# Patient Record
Sex: FEMALE | Race: WHITE | Employment: OTHER | URBAN - METROPOLITAN AREA
[De-identification: names, ages, dates, MRNs, and addresses within clinical notes are randomized per-mention and may not be internally consistent; named-entity substitution may affect disease eponyms.]

---

## 2018-04-24 ENCOUNTER — OFFICE VISIT (OUTPATIENT)
Dept: URGENT CARE | Age: 58
End: 2018-04-24

## 2018-04-24 VITALS
HEIGHT: 63 IN | HEART RATE: 68 BPM | WEIGHT: 238 LBS | OXYGEN SATURATION: 99 % | RESPIRATION RATE: 18 BRPM | DIASTOLIC BLOOD PRESSURE: 83 MMHG | SYSTOLIC BLOOD PRESSURE: 139 MMHG | TEMPERATURE: 97.8 F | BODY MASS INDEX: 42.17 KG/M2
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
## 2018-04-24 RX ORDER — PREDNISONE 10 MG/1
TABLET ORAL
Qty: 21 TAB | Refills: 0 | Status: SHIPPED | OUTPATIENT
Start: 2018-04-24 | End: 2018-08-10 | Stop reason: ALTCHOICE

## 2018-04-24 NOTE — PROGRESS NOTES
Patient is a 59 y.o. female presenting with rash. Rash    The history is provided by the patient. This is a new problem. The current episode started yesterday (reports multiple insect bited on left arm ). Associated with: unknown insect. There has been no fever. The rash is present on the left arm. The patient is experiencing no pain. Associated symptoms include itching. Pertinent negatives include no blisters. She has tried nothing for the symptoms. Past Medical History:   Diagnosis Date    Elevated BP         Past Surgical History:   Procedure Laterality Date    HX  SECTION      X4         Family History   Problem Relation Age of Onset    Hypertension Mother     Cancer Brother      lymphoma    Cancer Maternal Uncle      unknown    Hypertension Maternal Grandmother     Hypertension Maternal Grandfather     Cancer Maternal Grandfather      stomach cancer    Cancer Other      breast cancer (cousin)        Social History     Social History    Marital status:      Spouse name: N/A    Number of children: N/A    Years of education: N/A     Occupational History    Not on file. Social History Main Topics    Smoking status: Never Smoker    Smokeless tobacco: Never Used    Alcohol use No    Drug use: No    Sexual activity: Yes     Other Topics Concern    Not on file     Social History Narrative                ALLERGIES: Review of patient's allergies indicates no known allergies. Review of Systems   Constitutional: Negative for chills and fever. Respiratory: Negative for shortness of breath and wheezing. Cardiovascular: Negative for chest pain and palpitations. Musculoskeletal: Negative for myalgias. Skin: Positive for color change, itching and rash. Neurological: Negative for weakness and numbness. Hematological: Negative for adenopathy.        Vitals:    18 1416   BP: 139/83   Pulse: 68   Resp: 18   Temp: 97.8 °F (36.6 °C)   SpO2: 99%   Weight: 238 lb (108 kg)   Height: 5' 3\" (1.6 m)       Physical Exam   Constitutional: She appears well-developed and well-nourished. No distress. Neurological: She is alert. Skin: She is not diaphoretic. Left arm: multiple large erythematous raised lesions with central puncture wounds; non-ttp; no fluctuance   Psychiatric: She has a normal mood and affect. Her behavior is normal. Judgment and thought content normal.   Nursing note and vitals reviewed. MDM    Procedures        ICD-10-CM ICD-9-CM    1. Insect bite, initial encounter W57. XXXA 919.4      E906.4      Medications Ordered Today   Medications    predniSONE (STERAPRED DS) 10 mg dose pack     Sig: Take as directed for 6 days, with food     Dispense:  21 Tab     Refill:  0     The patients condition was discussed with the patient and they understand. The patient is to follow up with PCP. If signs and symptoms become worse the pt is to go to the ER. The patient is to take medications as prescribed.

## 2018-08-10 ENCOUNTER — OFFICE VISIT (OUTPATIENT)
Dept: OBGYN CLINIC | Age: 58
End: 2018-08-10

## 2018-08-10 VITALS
HEIGHT: 63 IN | RESPIRATION RATE: 16 BRPM | WEIGHT: 226.6 LBS | SYSTOLIC BLOOD PRESSURE: 140 MMHG | DIASTOLIC BLOOD PRESSURE: 80 MMHG | BODY MASS INDEX: 40.15 KG/M2

## 2018-08-10 DIAGNOSIS — N95.0 POST-MENOPAUSAL BLEEDING: ICD-10-CM

## 2018-08-10 DIAGNOSIS — N93.9 VAGINAL BLEEDING: Primary | ICD-10-CM

## 2018-08-10 PROBLEM — E66.01 OBESITY, MORBID (HCC): Status: ACTIVE | Noted: 2018-08-10

## 2018-08-10 LAB
BILIRUB UR QL STRIP: NEGATIVE
GLUCOSE UR-MCNC: NEGATIVE MG/DL
KETONES P FAST UR STRIP-MCNC: NORMAL MG/DL
PH UR STRIP: 5 [PH] (ref 4.6–8)
PROT UR QL STRIP: NORMAL
SP GR UR STRIP: 1.01 (ref 1–1.03)
UA UROBILINOGEN AMB POC: NORMAL (ref 0.2–1)
URINALYSIS CLARITY POC: CLEAR
URINALYSIS COLOR POC: YELLOW
URINE BLOOD POC: NORMAL
URINE LEUKOCYTES POC: NORMAL
URINE NITRITES POC: NEGATIVE

## 2018-08-10 NOTE — PROGRESS NOTES
Chidi Yi is a 59 y.o. female    Chief Complaint   Patient presents with    Vaginal Bleeding    Back Pain     Visit Vitals    /80    Resp 16    Ht 5' 3\" (1.6 m)    Wt 226 lb 9.6 oz (102.8 kg)    BMI 40.14 kg/m2          Chidi Yi is a 59 y.o. female who complains of vaginal bleeding     Her current method of family planning is none. The patient is not sexually active. She developed this problem approximately 1 week ago. Pink staining. Lower abdominal and back discomfort. Minimal dysuria  No other hx of vaginal bleeding      Her relevant past medical history:   Past Medical History:   Diagnosis Date    Elevated BP         Past Surgical History:   Procedure Laterality Date    HX  SECTION      X4     Social History     Occupational History    Not on file.      Social History Main Topics    Smoking status: Never Smoker    Smokeless tobacco: Never Used    Alcohol use No    Drug use: No    Sexual activity: Yes     Family History   Problem Relation Age of Onset    Hypertension Mother     Cancer Brother      lymphoma    Cancer Maternal Uncle      unknown    Hypertension Maternal Grandmother     Hypertension Maternal Grandfather     Cancer Maternal Grandfather      stomach cancer    Cancer Other      breast cancer (cousin)       No Known Allergies  Prior to Admission medications    Not on File        Review of Systems - History obtained from the patient  Constitutional: negative for weight loss, fever, night sweats  HEENT: negative for hearing loss, earache, congestion, snoring, sorethroat  CV: negative for chest pain, palpitations, edema  Resp: negative for cough, shortness of breath, wheezing  Breast: negative for breast lumps, nipple discharge, galactorrhea  GI: negative for change in bowel habits, abdominal pain, black or bloody stools  : negative for frequency, dysuria, hematuria  MSK: negative for back pain, joint pain, muscle pain  Skin: negative for itching, rash, hives  Neuro: negative for dizziness, headache, confusion, weakness  Psych: negative for anxiety, depression, change in mood  Heme/lymph: negative for bleeding, bruising, pallor      Objective:  Visit Vitals    /80    Resp 16    Ht 5' 3\" (1.6 m)    Wt 226 lb 9.6 oz (102.8 kg)    BMI 40.14 kg/m2          PHYSICAL EXAMINATION    Constitutional  · Appearance: well-nourished, well developed, alert, in no acute distress    HENT  · Head and Face: appears normal      Gastrointestinal  · Abdominal Examination: abdomen non-tender to palpation, normal bowel sounds, no masses present  · Liver and spleen: no hepatomegaly present, spleen not palpable  · Hernias: no hernias identified    Genitourinary  · External Genitalia: normal appearance for age, no discharge present, no tenderness present, no inflammatory lesions present, no masses present,  atrophy present  · Vagina: normal vaginal vault without central or paravaginal defects, no discharge present, no inflammatory lesions present, no masses present  · Bladder: non-tender to palpation  · Urethra: appears normal  · Cervix: normal   · Uterus: normal size, shape and consistency  · Adnexa: no adnexal tenderness present, no adnexal masses present  · Perineum: perineum within normal limits, no evidence of trauma, no rashes or skin lesions present  · Anus: anus within normal limits, no hemorrhoids present  · Inguinal Lymph Nodes: no lymphadenopathy present  Procedure note: Endometrial biopsy    Yamilet Son is a No obstetric history on file. ,  59 y.o. female [facility] No LMP recorded. Patient is postmenopausal.  The patient has a history of The encounter diagnosis was Vaginal bleeding. and presents for an endometrial biopsy. Indications:   After the indications, risks, benefits, and alternatives to performing an endometrial biopsy were explained to the patient, her questions were answered and informed consent was obtained. Procedure:   The patient was placed on the table in the dorsal lithotomy position. A bimanual exam showed the uterus to be anterior. The uterus was not enlarged. A speculum was placed in the vagina. The cervix was visualized and prepped with betadine. An Allis tenaculum was   placed on the anterior lip of the cervix for traction. It was   necessary to dilate the cervix. A pipelle was passed through the endocervical canal without difficulty. The uterus was sounded to 8 cm's. A moderate amount of tissue was returned. This tissue was placed in formalin and sent to pathology. It was felt that an adequate sample was obtained. The patient tolerated the procedure well and she reported mild cramping. The tenaculum and speculum were removed. Post Procedural Status: The patient was observed for 2 minutes after the procedure. She had mild cramping at the time of discharge. There were no complications. The patient was discharged in stable condition.        Skin  · General Inspection: no rash, no lesions identified    Neurologic/Psychiatric  · Mental Status:  · Orientation: grossly oriented to person, place and time  · Mood and Affect: mood normal, affect appropriate    Assessment:    possible postmenopausal bleeding vs UTI    Plan:   Reviewed office UA results; send urine culture  pipelle now  Reassured if pipelle negative, no further intervention unless persistent bleeding; no evidence of bleeding on today's exam

## 2018-08-12 LAB — BACTERIA UR CULT: NO GROWTH

## 2018-08-16 LAB
DX ICD CODE: NORMAL
DX ICD CODE: NORMAL
PATH REPORT.FINAL DX SPEC: NORMAL
PATH REPORT.GROSS SPEC: NORMAL
PATH REPORT.RELEVANT HX SPEC: NORMAL
PATH REPORT.SITE OF ORIGIN SPEC: NORMAL
PATHOLOGIST NAME: NORMAL
PAYMENT PROCEDURE: NORMAL

## 2018-08-22 ENCOUNTER — OFFICE VISIT (OUTPATIENT)
Dept: GYNECOLOGY | Age: 58
End: 2018-08-22

## 2018-08-22 VITALS
HEIGHT: 63 IN | DIASTOLIC BLOOD PRESSURE: 79 MMHG | WEIGHT: 231 LBS | BODY MASS INDEX: 40.93 KG/M2 | SYSTOLIC BLOOD PRESSURE: 142 MMHG

## 2018-08-22 DIAGNOSIS — N85.00 ENDOMETRIAL HYPERPLASIA: Primary | ICD-10-CM

## 2018-08-22 NOTE — PROGRESS NOTES
New patient referred by Israel López for Complex Hyperplasia. Patient states no abnormal pain, spotting or bleeding.

## 2018-08-22 NOTE — LETTER
2018 2:44 PM 
 
Patient:  Shannen Henderson YOB: 1960 Date of Visit: 2018 Dear No Recipients: Thank you for referring Ms. Karine Cowan to me for evaluation/treatment. Below are the relevant portions of my assessment and plan of care. New patient referred by Aruna Olmstead for Complex Hyperplasia. Patient states no abnormal pain, spotting or bleeding. [address] [facility], [address] [place] [address] 
P [phone]  F [phone] Office Note Patient ID: 
Name:  Shannen Henderson MRN:  [id number] :  1960/58 y.o. Date:  2018 HISTORY OF PRESENT ILLNESS: 
Shannen Henderson is a 59 y.o.  postmenopausal female who is being seen for endometrial hyperplasia. She is referred by Dr. Waldron Klinefelter. She presented to Dr. Valentino Lane complaining of some vaginal bleeding, which had only been ongoing for about a week. Dr. Valentino Lane performed an endometrial biopsy. Pathology revealed complex hyperplasia without atypia, but a well differentiated endometrial cancer could not be excluded. I have been asked to see her in consultation for further evaluation and management. ROS: 
 and GI review:  Negative Cardiopulmonary review:  Negative Musculoskeletal:  Negative A comprehensive review of systems was negative except for that written in the History of Present Illness. , 10 point ROS 
 
 
OB/GYN ROS: 
  section x 4 Problem List: 
Patient Active Problem List  
 Diagnosis Date Noted  Endometrial hyperplasia 2018  Obesity, morbid ([address] 08/10/2018 PMH: 
Past Medical History:  
Diagnosis Date  Elevated BP   
  
PSH: 
Past Surgical History:  
Procedure Laterality Date  HX  SECTION    
 X4 Social History: 
Social History Substance Use Topics  Smoking status: Never Smoker  Smokeless tobacco: Never Used  Alcohol use No  
  
Family History: 
Family History Problem Relation Age of Onset  Hypertension Mother  Cancer Brother   
  lymphoma  Cancer Maternal Uncle   
  unknown  Hypertension Maternal Grandmother  Hypertension Maternal Grandfather  Cancer Maternal Grandfather   
  stomach cancer  Cancer Other   
  breast cancer (cousin) Medications: (reviewed) No current outpatient prescriptions on file. No current facility-administered medications for this visit. Allergies: (reviewed) No Known Allergies OBJECTIVE: 
 
Physical Exam: VITAL SIGNS: Vitals:  
 08/22/18 1427 BP: 142/79 Weight: 231 lb (104.8 kg) Height: 5' 2.99\" (1.6 m) Body mass index is 40.93 kg/(m^2). GENERAL JAK: Conversant, alert, oriented. No acute distress. HEENT: HEENT. No thyroid enlargement. No JVD. Neck: Supple without restrictions. RESPIRATORY: Clear to auscultation and percussion to the bases. No CVAT. CARDIOVASC: RRR without murmur/rub. GASTROINT: soft, non-tender, without masses or organomegaly MUSCULOSKEL: no joint tenderness, deformity or swelling EXTREMITIES: extremities normal, atraumatic, no cyanosis or edema PELVIC: Vulva and vagina appear normal. Bimanual exam reveals normal uterus and adnexa. RECTAL: Deferred ALVARO SURVEY: No suspicious lymphadenopathy or edema noted. NEURO: Grossly intact. No acute deficit. Imaging: 
None IMPRESSION/PLAN: 
Ford Tam is a 59 y.o. female with a working diagnosis of complex endometrial hyperplasia. I reviewed with Ford Tam her medical records, physical exam, and review of symptoms. I explained to her and her  the difficulty differentiating between hyperplasia and malignancy on biopsy and discussed the possibility of cancer. I recommended TLH, BSO with frozen section pathology to rule out malignancy. If cancer is identified, we will proceed with staging lymphadenectomy based upon the pathology findings.   She was counseled on the risks, benefits, indications, and alternatives of surgery. Her questions were answered and she wishes to proceed as planned. Signed By: Galo Cavazos MD   
 8/22/2018/2:34 PM  
 
  
 
If you have questions, please do not hesitate to call me. I look forward to following Ms. Isatu Tran along with you.  
 
 
 
Sincerely, 
 
 
Galo Cavazos MD

## 2018-08-22 NOTE — PROGRESS NOTES
[facility] [address] [facility]  P [phone]  F [phone]    Office Note  Patient ID:  Name:  Margarita Theodore  MRN:  [id number]  :  1960/58 y.o. Date:  2018      HISTORY OF PRESENT ILLNESS:  Margarita Theodore is a 59 y.o.  postmenopausal female who is being seen for endometrial hyperplasia. She is referred by Dr. Deanna Feng. She presented to Dr. Martha Oneil complaining of some vaginal bleeding, which had only been ongoing for about a week. Dr. Martha Oneil performed an endometrial biopsy. Pathology revealed complex hyperplasia without atypia, but a well differentiated endometrial cancer could not be excluded. I have been asked to see her in consultation for further evaluation and management. ROS:   and GI review:  Negative  Cardiopulmonary review:  Negative   Musculoskeletal:  Negative    A comprehensive review of systems was negative except for that written in the History of Present Illness. , 10 point ROS      OB/GYN ROS:     section x 4      Problem List:  Patient Active Problem List    Diagnosis Date Noted    Endometrial hyperplasia 2018    Obesity, morbid ([address] 08/10/2018     PMH:  Past Medical History:   Diagnosis Date    Elevated BP       PSH:  Past Surgical History:   Procedure Laterality Date    HX  SECTION      X4      Social History:  Social History   Substance Use Topics    Smoking status: Never Smoker    Smokeless tobacco: Never Used    Alcohol use No      Family History:  Family History   Problem Relation Age of Onset    Hypertension Mother     Cancer Brother      lymphoma    Cancer Maternal Uncle      unknown    Hypertension Maternal Grandmother     Hypertension Maternal Grandfather     Cancer Maternal Grandfather      stomach cancer    Cancer Other      breast cancer (cousin)      Medications: (reviewed)  No current outpatient prescriptions on file.      No current facility-administered medications for this visit. Allergies: (reviewed)  No Known Allergies       OBJECTIVE:    Physical Exam:  VITAL SIGNS: Vitals:    08/22/18 1427   BP: 142/79   Weight: 231 lb (104.8 kg)   Height: 5' 2.99\" (1.6 m)     Body mass index is 40.93 kg/(m^2). GENERAL JAK: Conversant, alert, oriented. No acute distress. HEENT: HEENT. No thyroid enlargement. No JVD. Neck: Supple without restrictions. RESPIRATORY: Clear to auscultation and percussion to the bases. No CVAT. CARDIOVASC: RRR without murmur/rub. GASTROINT: soft, non-tender, without masses or organomegaly   MUSCULOSKEL: no joint tenderness, deformity or swelling   EXTREMITIES: extremities normal, atraumatic, no cyanosis or edema   PELVIC: Vulva and vagina appear normal. Bimanual exam reveals normal uterus and adnexa. RECTAL: Deferred   ALVARO SURVEY: No suspicious lymphadenopathy or edema noted. NEURO: Grossly intact. No acute deficit. Imaging:  None      IMPRESSION/PLAN:  Luisito Soto is a 59 y.o. female with a working diagnosis of complex endometrial hyperplasia. I reviewed with Luisito Soto her medical records, physical exam, and review of symptoms. I explained to her and her  the difficulty differentiating between hyperplasia and malignancy on biopsy and discussed the possibility of cancer. I recommended TLH, BSO with frozen section pathology to rule out malignancy. If cancer is identified, we will proceed with staging lymphadenectomy based upon the pathology findings. She was counseled on the risks, benefits, indications, and alternatives of surgery. Her questions were answered and she wishes to proceed as planned.           Signed By: Ashley Gomez MD     8/22/2018/2:34 PM

## 2018-08-30 ENCOUNTER — HOSPITAL ENCOUNTER (OUTPATIENT)
Dept: GENERAL RADIOLOGY | Age: 58
Discharge: HOME | End: 2018-08-30
Payer: SELF-PAY

## 2018-08-30 ENCOUNTER — HOSPITAL ENCOUNTER (OUTPATIENT)
Dept: PREADMISSION TESTING | Age: 58
Discharge: HOME | End: 2018-08-30
Payer: SELF-PAY

## 2018-08-30 VITALS
HEIGHT: 63 IN | DIASTOLIC BLOOD PRESSURE: 78 MMHG | HEART RATE: 75 BPM | BODY MASS INDEX: 40.84 KG/M2 | WEIGHT: 230.5 LBS | RESPIRATION RATE: 16 BRPM | TEMPERATURE: 98.5 F | SYSTOLIC BLOOD PRESSURE: 153 MMHG

## 2018-08-30 LAB
ALBUMIN SERPL-MCNC: 3.6 G/DL (ref 3.5–5)
ALBUMIN/GLOB SERPL: 0.9 {RATIO} (ref 1.1–2.2)
ALP SERPL-CCNC: 104 U/L (ref 45–117)
ALT SERPL-CCNC: 25 U/L (ref 12–78)
ANION GAP SERPL CALC-SCNC: 10 MMOL/L (ref 5–15)
AST SERPL-CCNC: 20 U/L (ref 15–37)
ATRIAL RATE: 75 BPM
BASOPHILS # BLD: 0 K/UL (ref 0–0.1)
BASOPHILS NFR BLD: 1 % (ref 0–1)
BILIRUB SERPL-MCNC: 0.3 MG/DL (ref 0.2–1)
BUN SERPL-MCNC: 19 MG/DL (ref 6–20)
BUN/CREAT SERPL: 19 (ref 12–20)
CALCIUM SERPL-MCNC: 9 MG/DL (ref 8.5–10.1)
CALCULATED P AXIS, ECG09: 74 DEGREES
CALCULATED R AXIS, ECG10: 78 DEGREES
CALCULATED T AXIS, ECG11: 58 DEGREES
CHLORIDE SERPL-SCNC: 103 MMOL/L (ref 97–108)
CO2 SERPL-SCNC: 27 MMOL/L (ref 21–32)
CREAT SERPL-MCNC: 1.01 MG/DL (ref 0.55–1.02)
DIAGNOSIS, 93000: NORMAL
DIFFERENTIAL METHOD BLD: ABNORMAL
EOSINOPHIL # BLD: 0.2 K/UL (ref 0–0.4)
EOSINOPHIL NFR BLD: 2 % (ref 0–7)
ERYTHROCYTE [DISTWIDTH] IN BLOOD BY AUTOMATED COUNT: 13 % (ref 11.5–14.5)
GLOBULIN SER CALC-MCNC: 4 G/DL (ref 2–4)
GLUCOSE SERPL-MCNC: 103 MG/DL (ref 65–100)
HCT VFR BLD AUTO: 44.1 % (ref 35–47)
HGB BLD-MCNC: 14.3 G/DL (ref 11.5–16)
IMM GRANULOCYTES # BLD: 0 K/UL (ref 0–0.04)
IMM GRANULOCYTES NFR BLD AUTO: 1 % (ref 0–0.5)
LYMPHOCYTES # BLD: 2.7 K/UL (ref 0.8–3.5)
LYMPHOCYTES NFR BLD: 33 % (ref 12–49)
MCH RBC QN AUTO: 27.7 PG (ref 26–34)
MCHC RBC AUTO-ENTMCNC: 32.4 G/DL (ref 30–36.5)
MCV RBC AUTO: 85.3 FL (ref 80–99)
MONOCYTES # BLD: 0.5 K/UL (ref 0–1)
MONOCYTES NFR BLD: 6 % (ref 5–13)
NEUTS SEG # BLD: 4.7 K/UL (ref 1.8–8)
NEUTS SEG NFR BLD: 58 % (ref 32–75)
NRBC # BLD: 0 K/UL (ref 0–0.01)
NRBC BLD-RTO: 0 PER 100 WBC
P-R INTERVAL, ECG05: 190 MS
PLATELET # BLD AUTO: 344 K/UL (ref 150–400)
PMV BLD AUTO: 9.5 FL (ref 8.9–12.9)
POTASSIUM SERPL-SCNC: 4 MMOL/L (ref 3.5–5.1)
PROT SERPL-MCNC: 7.6 G/DL (ref 6.4–8.2)
Q-T INTERVAL, ECG07: 416 MS
QRS DURATION, ECG06: 86 MS
QTC CALCULATION (BEZET), ECG08: 464 MS
RBC # BLD AUTO: 5.17 M/UL (ref 3.8–5.2)
SODIUM SERPL-SCNC: 140 MMOL/L (ref 136–145)
VENTRICULAR RATE, ECG03: 75 BPM
WBC # BLD AUTO: 8.1 K/UL (ref 3.6–11)

## 2018-08-30 PROCEDURE — 80053 COMPREHEN METABOLIC PANEL: CPT | Performed by: OBSTETRICS & GYNECOLOGY

## 2018-08-30 PROCEDURE — 71046 X-RAY EXAM CHEST 2 VIEWS: CPT

## 2018-08-30 PROCEDURE — 36415 COLL VENOUS BLD VENIPUNCTURE: CPT | Performed by: OBSTETRICS & GYNECOLOGY

## 2018-08-30 PROCEDURE — 85025 COMPLETE CBC W/AUTO DIFF WBC: CPT | Performed by: OBSTETRICS & GYNECOLOGY

## 2018-08-30 PROCEDURE — 93005 ELECTROCARDIOGRAM TRACING: CPT

## 2018-08-30 RX ORDER — ACETAMINOPHEN 325 MG/1
650 TABLET ORAL
COMMUNITY

## 2018-08-30 RX ORDER — IBUPROFEN 200 MG
4 CAPSULE ORAL
Status: ON HOLD | COMMUNITY
End: 2020-02-17 | Stop reason: SDUPTHER

## 2018-08-30 NOTE — PERIOP NOTES
PATIENT GIVEN SURGICAL SITE INFECTION FAQ HANDOUT AND HAND WASHING TIP SHEET. PREOP INSTRUCTIONS REVIEWED AND PATIENT VERBALIZES UNDERSTANDING OF INSTRUCTIONS. PATIENT HAS BEEN GIVEN THE OPPORTUNITY TO ASK ADDITIONAL QUESTIONS. PATIENT GIVEN 2 PACKAGES OF 6-CHG WIPES AND INSTRUCTIONS. PATIENT VERBALIZED UNDERSTANDING. PRIMARY LANGUAGE ISPORTUGUESE , HOWEVER PT SPEAKS ENGLISH WELL AND DECLINED AN .

## 2018-09-10 ENCOUNTER — ANESTHESIA EVENT (OUTPATIENT)
Dept: SURGERY | Age: 58
End: 2018-09-10
Payer: SELF-PAY

## 2018-09-10 ENCOUNTER — ANESTHESIA (OUTPATIENT)
Dept: SURGERY | Age: 58
End: 2018-09-10
Payer: SELF-PAY

## 2018-09-10 ENCOUNTER — HOSPITAL ENCOUNTER (OUTPATIENT)
Age: 58
Setting detail: OBSERVATION
Discharge: HOME | End: 2018-09-11
Attending: OBSTETRICS & GYNECOLOGY | Admitting: OBSTETRICS & GYNECOLOGY
Payer: SELF-PAY

## 2018-09-10 DIAGNOSIS — G89.18 POST-OP PAIN: ICD-10-CM

## 2018-09-10 DIAGNOSIS — T81.40XA POSTOPERATIVE INFECTION, INITIAL ENCOUNTER: Primary | ICD-10-CM

## 2018-09-10 PROCEDURE — 76060000034 HC ANESTHESIA 1.5 TO 2 HR: Performed by: OBSTETRICS & GYNECOLOGY

## 2018-09-10 PROCEDURE — 74011250636 HC RX REV CODE- 250/636: Performed by: OBSTETRICS & GYNECOLOGY

## 2018-09-10 PROCEDURE — 74011000250 HC RX REV CODE- 250

## 2018-09-10 PROCEDURE — 77030032490 HC SLV COMPR SCD KNE COVD -B: Performed by: OBSTETRICS & GYNECOLOGY

## 2018-09-10 PROCEDURE — 77030020263 HC SOL INJ SOD CL0.9% LFCR 1000ML: Performed by: OBSTETRICS & GYNECOLOGY

## 2018-09-10 PROCEDURE — 77030035048 HC TRCR ENDOSC OPTCL COVD -B: Performed by: OBSTETRICS & GYNECOLOGY

## 2018-09-10 PROCEDURE — 77030008684 HC TU ET CUF COVD -B: Performed by: ANESTHESIOLOGY

## 2018-09-10 PROCEDURE — 77030002882 HC SUT CART KNOT LSIS -B: Performed by: OBSTETRICS & GYNECOLOGY

## 2018-09-10 PROCEDURE — 99218 HC RM OBSERVATION: CPT

## 2018-09-10 PROCEDURE — 77030034696 HC CATH URETH FOL 2W BARD -A: Performed by: OBSTETRICS & GYNECOLOGY

## 2018-09-10 PROCEDURE — 77030039266 HC ADH SKN EXOFIN S2SG -A: Performed by: OBSTETRICS & GYNECOLOGY

## 2018-09-10 PROCEDURE — 77030035045 HC TRCR ENDOSC VRSPRT BLDLSS COVD -B: Performed by: OBSTETRICS & GYNECOLOGY

## 2018-09-10 PROCEDURE — 77030026438 HC STYL ET INTUB CARD -A: Performed by: ANESTHESIOLOGY

## 2018-09-10 PROCEDURE — 88112 CYTOPATH CELL ENHANCE TECH: CPT | Performed by: OBSTETRICS & GYNECOLOGY

## 2018-09-10 PROCEDURE — 76210000016 HC OR PH I REC 1 TO 1.5 HR: Performed by: OBSTETRICS & GYNECOLOGY

## 2018-09-10 PROCEDURE — 77030018836 HC SOL IRR NACL ICUM -A: Performed by: OBSTETRICS & GYNECOLOGY

## 2018-09-10 PROCEDURE — 88332 PATH CONSLTJ SURG EA ADD BLK: CPT | Performed by: OBSTETRICS & GYNECOLOGY

## 2018-09-10 PROCEDURE — 77030002933 HC SUT MCRYL J&J -A: Performed by: OBSTETRICS & GYNECOLOGY

## 2018-09-10 PROCEDURE — 77030013079 HC BLNKT BAIR HGGR 3M -A: Performed by: ANESTHESIOLOGY

## 2018-09-10 PROCEDURE — 74011250636 HC RX REV CODE- 250/636

## 2018-09-10 PROCEDURE — 77030008756 HC TU IRR SUC STRY -B: Performed by: OBSTETRICS & GYNECOLOGY

## 2018-09-10 PROCEDURE — 88331 PATH CONSLTJ SURG 1 BLK 1SPC: CPT | Performed by: OBSTETRICS & GYNECOLOGY

## 2018-09-10 PROCEDURE — 74011250636 HC RX REV CODE- 250/636: Performed by: ANESTHESIOLOGY

## 2018-09-10 PROCEDURE — 77030011640 HC PAD GRND REM COVD -A: Performed by: OBSTETRICS & GYNECOLOGY

## 2018-09-10 PROCEDURE — 76010000153 HC OR TIME 1.5 TO 2 HR: Performed by: OBSTETRICS & GYNECOLOGY

## 2018-09-10 PROCEDURE — 77030020782 HC GWN BAIR PAWS FLX 3M -B

## 2018-09-10 PROCEDURE — 77030033639 HC SHR ENDO COAG HARM 36 J&J -E: Performed by: OBSTETRICS & GYNECOLOGY

## 2018-09-10 PROCEDURE — 74011000258 HC RX REV CODE- 258: Performed by: OBSTETRICS & GYNECOLOGY

## 2018-09-10 PROCEDURE — 74011000250 HC RX REV CODE- 250: Performed by: OBSTETRICS & GYNECOLOGY

## 2018-09-10 PROCEDURE — 77030035051: Performed by: OBSTETRICS & GYNECOLOGY

## 2018-09-10 PROCEDURE — 88309 TISSUE EXAM BY PATHOLOGIST: CPT | Performed by: OBSTETRICS & GYNECOLOGY

## 2018-09-10 PROCEDURE — 77030002903 HC SUT DEV PLCMNT LSIS -C: Performed by: OBSTETRICS & GYNECOLOGY

## 2018-09-10 RX ORDER — DIPHENHYDRAMINE HYDROCHLORIDE 50 MG/ML
12.5 INJECTION, SOLUTION INTRAMUSCULAR; INTRAVENOUS AS NEEDED
Status: DISCONTINUED | OUTPATIENT
Start: 2018-09-10 | End: 2018-09-10 | Stop reason: HOSPADM

## 2018-09-10 RX ORDER — PROCHLORPERAZINE EDISYLATE 5 MG/ML
10 INJECTION INTRAMUSCULAR; INTRAVENOUS
Status: DISCONTINUED | OUTPATIENT
Start: 2018-09-10 | End: 2018-09-10 | Stop reason: SDUPTHER

## 2018-09-10 RX ORDER — LIDOCAINE HYDROCHLORIDE 10 MG/ML
0.1 INJECTION, SOLUTION EPIDURAL; INFILTRATION; INTRACAUDAL; PERINEURAL AS NEEDED
Status: DISCONTINUED | OUTPATIENT
Start: 2018-09-10 | End: 2018-09-10 | Stop reason: HOSPADM

## 2018-09-10 RX ORDER — MIDAZOLAM HYDROCHLORIDE 1 MG/ML
1 INJECTION, SOLUTION INTRAMUSCULAR; INTRAVENOUS AS NEEDED
Status: DISCONTINUED | OUTPATIENT
Start: 2018-09-10 | End: 2018-09-10 | Stop reason: HOSPADM

## 2018-09-10 RX ORDER — KETOROLAC TROMETHAMINE 30 MG/ML
INJECTION, SOLUTION INTRAMUSCULAR; INTRAVENOUS AS NEEDED
Status: DISCONTINUED | OUTPATIENT
Start: 2018-09-10 | End: 2018-09-10 | Stop reason: HOSPADM

## 2018-09-10 RX ORDER — MORPHINE SULFATE 2 MG/ML
2 INJECTION, SOLUTION INTRAMUSCULAR; INTRAVENOUS
Status: DISCONTINUED | OUTPATIENT
Start: 2018-09-10 | End: 2018-09-11 | Stop reason: HOSPADM

## 2018-09-10 RX ORDER — DIPHENHYDRAMINE HYDROCHLORIDE 50 MG/ML
12.5 INJECTION, SOLUTION INTRAMUSCULAR; INTRAVENOUS
Status: DISCONTINUED | OUTPATIENT
Start: 2018-09-10 | End: 2018-09-11 | Stop reason: HOSPADM

## 2018-09-10 RX ORDER — LIDOCAINE HYDROCHLORIDE 20 MG/ML
INJECTION, SOLUTION EPIDURAL; INFILTRATION; INTRACAUDAL; PERINEURAL AS NEEDED
Status: DISCONTINUED | OUTPATIENT
Start: 2018-09-10 | End: 2018-09-10 | Stop reason: HOSPADM

## 2018-09-10 RX ORDER — FENTANYL CITRATE 50 UG/ML
25 INJECTION, SOLUTION INTRAMUSCULAR; INTRAVENOUS
Status: COMPLETED | OUTPATIENT
Start: 2018-09-10 | End: 2018-09-10

## 2018-09-10 RX ORDER — OXYCODONE AND ACETAMINOPHEN 5; 325 MG/1; MG/1
1 TABLET ORAL
Status: DISCONTINUED | OUTPATIENT
Start: 2018-09-10 | End: 2018-09-11 | Stop reason: HOSPADM

## 2018-09-10 RX ORDER — SODIUM CHLORIDE 0.9 % (FLUSH) 0.9 %
5-10 SYRINGE (ML) INJECTION EVERY 8 HOURS
Status: DISCONTINUED | OUTPATIENT
Start: 2018-09-10 | End: 2018-09-11 | Stop reason: HOSPADM

## 2018-09-10 RX ORDER — PROPOFOL 10 MG/ML
INJECTION, EMULSION INTRAVENOUS AS NEEDED
Status: DISCONTINUED | OUTPATIENT
Start: 2018-09-10 | End: 2018-09-10 | Stop reason: HOSPADM

## 2018-09-10 RX ORDER — SODIUM CHLORIDE 9 MG/ML
100 INJECTION, SOLUTION INTRAVENOUS CONTINUOUS
Status: DISCONTINUED | OUTPATIENT
Start: 2018-09-10 | End: 2018-09-11 | Stop reason: HOSPADM

## 2018-09-10 RX ORDER — SODIUM CHLORIDE 0.9 % (FLUSH) 0.9 %
5-10 SYRINGE (ML) INJECTION AS NEEDED
Status: DISCONTINUED | OUTPATIENT
Start: 2018-09-10 | End: 2018-09-10 | Stop reason: HOSPADM

## 2018-09-10 RX ORDER — SODIUM CHLORIDE, SODIUM LACTATE, POTASSIUM CHLORIDE, CALCIUM CHLORIDE 600; 310; 30; 20 MG/100ML; MG/100ML; MG/100ML; MG/100ML
25 INJECTION, SOLUTION INTRAVENOUS CONTINUOUS
Status: DISCONTINUED | OUTPATIENT
Start: 2018-09-10 | End: 2018-09-10 | Stop reason: HOSPADM

## 2018-09-10 RX ORDER — EPHEDRINE SULFATE 50 MG/ML
INJECTION, SOLUTION INTRAVENOUS AS NEEDED
Status: DISCONTINUED | OUTPATIENT
Start: 2018-09-10 | End: 2018-09-10 | Stop reason: HOSPADM

## 2018-09-10 RX ORDER — GLYCOPYRROLATE 0.2 MG/ML
INJECTION INTRAMUSCULAR; INTRAVENOUS AS NEEDED
Status: DISCONTINUED | OUTPATIENT
Start: 2018-09-10 | End: 2018-09-10 | Stop reason: HOSPADM

## 2018-09-10 RX ORDER — SODIUM CHLORIDE 9 MG/ML
25 INJECTION, SOLUTION INTRAVENOUS CONTINUOUS
Status: DISCONTINUED | OUTPATIENT
Start: 2018-09-10 | End: 2018-09-10 | Stop reason: HOSPADM

## 2018-09-10 RX ORDER — FENTANYL CITRATE 50 UG/ML
INJECTION, SOLUTION INTRAMUSCULAR; INTRAVENOUS AS NEEDED
Status: DISCONTINUED | OUTPATIENT
Start: 2018-09-10 | End: 2018-09-10 | Stop reason: HOSPADM

## 2018-09-10 RX ORDER — LORAZEPAM 2 MG/ML
1 INJECTION INTRAMUSCULAR
Status: DISCONTINUED | OUTPATIENT
Start: 2018-09-10 | End: 2018-09-11 | Stop reason: HOSPADM

## 2018-09-10 RX ORDER — ONDANSETRON 2 MG/ML
4 INJECTION INTRAMUSCULAR; INTRAVENOUS AS NEEDED
Status: DISCONTINUED | OUTPATIENT
Start: 2018-09-10 | End: 2018-09-10 | Stop reason: HOSPADM

## 2018-09-10 RX ORDER — MIDAZOLAM HYDROCHLORIDE 1 MG/ML
INJECTION, SOLUTION INTRAMUSCULAR; INTRAVENOUS AS NEEDED
Status: DISCONTINUED | OUTPATIENT
Start: 2018-09-10 | End: 2018-09-10 | Stop reason: HOSPADM

## 2018-09-10 RX ORDER — OXYCODONE HYDROCHLORIDE 5 MG/1
5 TABLET ORAL AS NEEDED
Status: DISCONTINUED | OUTPATIENT
Start: 2018-09-10 | End: 2018-09-10 | Stop reason: HOSPADM

## 2018-09-10 RX ORDER — ONDANSETRON 2 MG/ML
INJECTION INTRAMUSCULAR; INTRAVENOUS AS NEEDED
Status: DISCONTINUED | OUTPATIENT
Start: 2018-09-10 | End: 2018-09-10 | Stop reason: HOSPADM

## 2018-09-10 RX ORDER — FENTANYL CITRATE 50 UG/ML
50 INJECTION, SOLUTION INTRAMUSCULAR; INTRAVENOUS AS NEEDED
Status: DISCONTINUED | OUTPATIENT
Start: 2018-09-10 | End: 2018-09-10 | Stop reason: HOSPADM

## 2018-09-10 RX ORDER — ROCURONIUM BROMIDE 10 MG/ML
INJECTION, SOLUTION INTRAVENOUS AS NEEDED
Status: DISCONTINUED | OUTPATIENT
Start: 2018-09-10 | End: 2018-09-10 | Stop reason: HOSPADM

## 2018-09-10 RX ORDER — PHENYLEPHRINE HCL IN 0.9% NACL 0.4MG/10ML
SYRINGE (ML) INTRAVENOUS AS NEEDED
Status: DISCONTINUED | OUTPATIENT
Start: 2018-09-10 | End: 2018-09-10 | Stop reason: HOSPADM

## 2018-09-10 RX ORDER — DEXAMETHASONE SODIUM PHOSPHATE 4 MG/ML
INJECTION, SOLUTION INTRA-ARTICULAR; INTRALESIONAL; INTRAMUSCULAR; INTRAVENOUS; SOFT TISSUE AS NEEDED
Status: DISCONTINUED | OUTPATIENT
Start: 2018-09-10 | End: 2018-09-10 | Stop reason: HOSPADM

## 2018-09-10 RX ORDER — MORPHINE SULFATE 10 MG/ML
2 INJECTION, SOLUTION INTRAMUSCULAR; INTRAVENOUS
Status: DISCONTINUED | OUTPATIENT
Start: 2018-09-10 | End: 2018-09-10 | Stop reason: HOSPADM

## 2018-09-10 RX ORDER — ROPIVACAINE HYDROCHLORIDE 5 MG/ML
30 INJECTION, SOLUTION EPIDURAL; INFILTRATION; PERINEURAL AS NEEDED
Status: DISCONTINUED | OUTPATIENT
Start: 2018-09-10 | End: 2018-09-10 | Stop reason: HOSPADM

## 2018-09-10 RX ORDER — SODIUM CHLORIDE 0.9 % (FLUSH) 0.9 %
5-10 SYRINGE (ML) INJECTION EVERY 8 HOURS
Status: DISCONTINUED | OUTPATIENT
Start: 2018-09-10 | End: 2018-09-10 | Stop reason: HOSPADM

## 2018-09-10 RX ORDER — SODIUM CHLORIDE, SODIUM LACTATE, POTASSIUM CHLORIDE, CALCIUM CHLORIDE 600; 310; 30; 20 MG/100ML; MG/100ML; MG/100ML; MG/100ML
INJECTION, SOLUTION INTRAVENOUS
Status: DISCONTINUED | OUTPATIENT
Start: 2018-09-10 | End: 2018-09-10 | Stop reason: HOSPADM

## 2018-09-10 RX ORDER — MIDAZOLAM HYDROCHLORIDE 1 MG/ML
0.5 INJECTION, SOLUTION INTRAMUSCULAR; INTRAVENOUS
Status: DISCONTINUED | OUTPATIENT
Start: 2018-09-10 | End: 2018-09-10 | Stop reason: HOSPADM

## 2018-09-10 RX ORDER — SODIUM CHLORIDE, SODIUM LACTATE, POTASSIUM CHLORIDE, CALCIUM CHLORIDE 600; 310; 30; 20 MG/100ML; MG/100ML; MG/100ML; MG/100ML
100 INJECTION, SOLUTION INTRAVENOUS CONTINUOUS
Status: DISCONTINUED | OUTPATIENT
Start: 2018-09-10 | End: 2018-09-10 | Stop reason: HOSPADM

## 2018-09-10 RX ORDER — NEOSTIGMINE METHYLSULFATE 1 MG/ML
INJECTION INTRAVENOUS AS NEEDED
Status: DISCONTINUED | OUTPATIENT
Start: 2018-09-10 | End: 2018-09-10 | Stop reason: HOSPADM

## 2018-09-10 RX ORDER — SUCCINYLCHOLINE CHLORIDE 20 MG/ML
INJECTION INTRAMUSCULAR; INTRAVENOUS AS NEEDED
Status: DISCONTINUED | OUTPATIENT
Start: 2018-09-10 | End: 2018-09-10 | Stop reason: HOSPADM

## 2018-09-10 RX ORDER — KETOROLAC TROMETHAMINE 30 MG/ML
30 INJECTION, SOLUTION INTRAMUSCULAR; INTRAVENOUS EVERY 6 HOURS
Status: DISCONTINUED | OUTPATIENT
Start: 2018-09-10 | End: 2018-09-11 | Stop reason: HOSPADM

## 2018-09-10 RX ORDER — SODIUM CHLORIDE 0.9 % (FLUSH) 0.9 %
5-10 SYRINGE (ML) INJECTION AS NEEDED
Status: DISCONTINUED | OUTPATIENT
Start: 2018-09-10 | End: 2018-09-11 | Stop reason: HOSPADM

## 2018-09-10 RX ORDER — ONDANSETRON 2 MG/ML
4 INJECTION INTRAMUSCULAR; INTRAVENOUS
Status: DISCONTINUED | OUTPATIENT
Start: 2018-09-10 | End: 2018-09-11 | Stop reason: HOSPADM

## 2018-09-10 RX ADMIN — FENTANYL CITRATE 25 MCG: 50 INJECTION, SOLUTION INTRAMUSCULAR; INTRAVENOUS at 14:23

## 2018-09-10 RX ADMIN — FENTANYL CITRATE 25 MCG: 50 INJECTION, SOLUTION INTRAMUSCULAR; INTRAVENOUS at 15:00

## 2018-09-10 RX ADMIN — ROCURONIUM BROMIDE 10 MG: 10 INJECTION, SOLUTION INTRAVENOUS at 12:46

## 2018-09-10 RX ADMIN — PROPOFOL 20 MG: 10 INJECTION, EMULSION INTRAVENOUS at 13:25

## 2018-09-10 RX ADMIN — EPHEDRINE SULFATE 5 MG: 50 INJECTION, SOLUTION INTRAVENOUS at 12:56

## 2018-09-10 RX ADMIN — Medication 80 MCG: at 12:57

## 2018-09-10 RX ADMIN — FENTANYL CITRATE 25 MCG: 50 INJECTION, SOLUTION INTRAMUSCULAR; INTRAVENOUS at 14:30

## 2018-09-10 RX ADMIN — MORPHINE SULFATE 2 MG: 10 INJECTION INTRAVENOUS at 14:35

## 2018-09-10 RX ADMIN — ONDANSETRON 4 MG: 2 INJECTION INTRAMUSCULAR; INTRAVENOUS at 12:14

## 2018-09-10 RX ADMIN — SODIUM CHLORIDE, SODIUM LACTATE, POTASSIUM CHLORIDE, CALCIUM CHLORIDE: 600; 310; 30; 20 INJECTION, SOLUTION INTRAVENOUS at 11:59

## 2018-09-10 RX ADMIN — Medication 80 MCG: at 12:52

## 2018-09-10 RX ADMIN — CEFOTETAN DISODIUM 2 G: 2 INJECTION, POWDER, FOR SOLUTION INTRAMUSCULAR; INTRAVENOUS at 12:12

## 2018-09-10 RX ADMIN — KETOROLAC TROMETHAMINE 30 MG: 30 INJECTION, SOLUTION INTRAMUSCULAR; INTRAVENOUS at 13:31

## 2018-09-10 RX ADMIN — ONDANSETRON 4 MG: 2 INJECTION INTRAMUSCULAR; INTRAVENOUS at 14:16

## 2018-09-10 RX ADMIN — MIDAZOLAM HYDROCHLORIDE 2 MG: 1 INJECTION, SOLUTION INTRAMUSCULAR; INTRAVENOUS at 11:59

## 2018-09-10 RX ADMIN — KETOROLAC TROMETHAMINE 30 MG: 30 INJECTION, SOLUTION INTRAMUSCULAR at 19:24

## 2018-09-10 RX ADMIN — SODIUM CHLORIDE 100 ML/HR: 900 INJECTION, SOLUTION INTRAVENOUS at 14:03

## 2018-09-10 RX ADMIN — ROCURONIUM BROMIDE 5 MG: 10 INJECTION, SOLUTION INTRAVENOUS at 12:04

## 2018-09-10 RX ADMIN — DEXAMETHASONE SODIUM PHOSPHATE 4 MG: 4 INJECTION, SOLUTION INTRA-ARTICULAR; INTRALESIONAL; INTRAMUSCULAR; INTRAVENOUS; SOFT TISSUE at 12:14

## 2018-09-10 RX ADMIN — GLYCOPYRROLATE 0.6 MG: 0.2 INJECTION INTRAMUSCULAR; INTRAVENOUS at 13:17

## 2018-09-10 RX ADMIN — NEOSTIGMINE METHYLSULFATE 3.5 MG: 1 INJECTION INTRAVENOUS at 13:17

## 2018-09-10 RX ADMIN — MORPHINE SULFATE 2 MG: 2 INJECTION, SOLUTION INTRAMUSCULAR; INTRAVENOUS at 15:43

## 2018-09-10 RX ADMIN — MORPHINE SULFATE 2 MG: 10 INJECTION INTRAVENOUS at 15:05

## 2018-09-10 RX ADMIN — FENTANYL CITRATE 50 MCG: 50 INJECTION, SOLUTION INTRAMUSCULAR; INTRAVENOUS at 12:04

## 2018-09-10 RX ADMIN — Medication 10 ML: at 19:25

## 2018-09-10 RX ADMIN — LIDOCAINE HYDROCHLORIDE 40 MG: 20 INJECTION, SOLUTION EPIDURAL; INFILTRATION; INTRACAUDAL; PERINEURAL at 12:04

## 2018-09-10 RX ADMIN — PROPOFOL 200 MG: 10 INJECTION, EMULSION INTRAVENOUS at 12:04

## 2018-09-10 RX ADMIN — CEFAZOLIN SODIUM 1 G: 1 INJECTION, POWDER, FOR SOLUTION INTRAMUSCULAR; INTRAVENOUS at 19:25

## 2018-09-10 RX ADMIN — FENTANYL CITRATE 25 MCG: 50 INJECTION, SOLUTION INTRAMUSCULAR; INTRAVENOUS at 14:18

## 2018-09-10 RX ADMIN — FENTANYL CITRATE 50 MCG: 50 INJECTION, SOLUTION INTRAMUSCULAR; INTRAVENOUS at 12:22

## 2018-09-10 RX ADMIN — SUCCINYLCHOLINE CHLORIDE 200 MG: 20 INJECTION INTRAMUSCULAR; INTRAVENOUS at 12:04

## 2018-09-10 RX ADMIN — ROCURONIUM BROMIDE 25 MG: 10 INJECTION, SOLUTION INTRAVENOUS at 12:13

## 2018-09-10 NOTE — IP AVS SNAPSHOT
Summary of Care Report The Summary of Care report has been created to help improve care coordination. Users with access to [employer] or [address] (Web-based application) may access additional patient information including the Discharge Summary. If you are not currently a [address] user and need more information, please call the number listed below in the [place] section and ask to be connected with Medical Records. Facility Information Name Address Phone [address] [phone] Patient Information Patient Name Sex  Carlene Bishop ([id number]) Female 1960 Discharge Information Admitting Provider Service Area Unit Kendra Rubio MD / [address] Unit / [phone] Discharge Provider Discharge Date/Time Discharge Disposition Destination (none) 2018 (Pending) AHR (none) Patient Language Language Serbian [33] Hospital Problems as of 2018  Reviewed: 2018  3:09 PM by Kendra Rubio MD  
  
  
  
 Class Noted - Resolved Last Modified POA Active Problems Endometrial hyperplasia  2018 - Present 9/10/2018 by Kendra Rubio MD Unknown Entered by Kendra Rubio MD  
  
Non-Hospital Problems as of 2018  Reviewed: 2018  3:09 PM by Kendra Rubio MD  
  
  
  
 Class Noted - Resolved Last Modified Active Problems Obesity, morbid ([address]  8/10/2018 - Present  by Merline Barrera MD  
  Entered by Merline Barrera MD  
  
You are allergic to the following No active allergies Current Discharge Medication List  
  
START taking these medications Dose & Instructions Dispensing Information Comments  
 traMADol 50 mg tablet Commonly known as:  ULTRAM  
 Dose:  50 mg  
 Take 1 Tab by mouth every six (6) hours as needed for Pain. Max Daily Amount: 200 mg. Quantity:  20 Tab Refills:  0 CONTINUE these medications which have NOT CHANGED Dose & Instructions Dispensing Information Comments  
 ibuprofen 200 mg Cap Dose:  4 Tab Take 4 Tabs by mouth as needed. Refills:  0  
   
 TYLENOL 325 mg tablet Generic drug:  acetaminophen Dose:  650 mg Take 650 mg by mouth every four (4) hours as needed for Pain. Refills:  0 Surgery Information ID Date/Time Status Primary Surgeon All Procedures Location [id number] 9/10/2018 1230 Unposted Umm Linn MD TOTAL LAPAROSCOPIC HYSTERECTOMY, BILATERAL SALPINGO OOPHRECTOM. Repair of vaginal tear [facility] MAIN OR Follow-up Information Follow up With Details Comments Contact Info None   None [phone] Patient stated that they have no PCP Umm Linn MD In 4 weeks For wound re-check [address] [address] [address] 
[phone] Discharge Instructions [address] [facility], [address] 
P [phone]  F [phone] Patient Discharge Instructions Luisa Bandar / [id number] : 1960 Admitted 9/10/2018 Discharged: 2018 Take Home Medications No current facility-administered medications on file prior to encounter. No current outpatient prescriptions on file prior to encounter. · It is important that you take the medication exactly as they are prescribed. · Keep your medication in the bottles provided by the pharmacist and keep a list of the medication names, dosages, and times to be taken in your wallet. · Do not take other medications without consulting your doctor. What to do at [facility] Recommended diet: Regular, stay hydrated. You should take a stool softener such as colace for constipation.  If constipation persists for >24 hours you should take a dose of Milk of Magnesia. Call if your constipation persists. If you have had a hysterectomy or vaginal surgery you may experience vaginal spotting. This is acceptable. Should the bleeding require more that 4 pads a day please call our office. Nothing per vagina for 6-8 weeks. Recommended activity: No driving for 1 week and/or while on pain medication Walk at least 6 times per day Showers okay, no tub bathing/swimming for two weeks Activity as able, stairs okay. Since you had a laparoscopic procedure, no lifting greater than 25 lbs for 3 weeks. If you experience any of the following persisting symptoms: 
 
Nausea/vomiting, fever > 101 F, diarrhea/constipation, increasing pain despite medication, increasing vaginal discharge or any concerns, please call our office. Follow-up with Dr. Juan F Vasquez in 4 weeks. Call [phone] for an appointment. Information obtained by : 
I understand that if any problems occur once I am at home I am to contact my physician. I understand and acknowledge receipt of the instructions indicated above. Physician's or R.N.'s Signature                                                                  Date/Time Patient or Representative Signature                                                          Date/Time Chart Review Routing History No Routing History on File

## 2018-09-10 NOTE — PERIOP NOTES
TRANSFER - OUT REPORT: 
 
Verbal report given to melanie (name) on Jeri Cain  being transferred to [phone] (unit) for routine post - op Report consisted of patients Situation, Background, Assessment and  
Recommendations(SBAR). Time Pre op antibiotic given:2 grams ancef at 1212pm 
Anesthesia Stop time: 1347 Mcnally Present on Transfer to floor:yes Order for Mcnally on Chart:yes Discharge Prescriptions with Chart:yes Information from the following report(s) SBAR, OR Summary, Procedure Summary, Intake/Output, MAR, Recent Results and Cardiac Rhythm nsr was reviewed with the receiving nurse. Opportunity for questions and clarification was provided. Is the patient on 02? YES 
     L/Min 2 Other Is the patient on a monitor? NO Is the nurse transporting with the patient? NO Surgical Waiting Area notified of patient's transfer from PACU? YES The following personal items collected during your admission accompanied patient upon transfer:  
Dental Appliance:   
Vision:   
Hearing Aid:   
Jewelry:   
Clothing:   
Other Valuables:   
Valuables sent to safe:   
 
Clothing bag x 1 on bed Glasses in bag

## 2018-09-10 NOTE — ROUTINE PROCESS
Patient: Herbert Gonzales MRN: [id number]  SSN: [id number] YOB: 1960  Age: 59 y.o. Sex: female Patient is status post Procedure(s): 
TOTAL LAPAROSCOPIC HYSTERECTOMY, BILATERAL SALPINGO OOPHRECTOM. Repair of vaginal tear. Surgeon(s) and Role: Pamela Pruitt MD - Primary Local/Dose/Irrigation: Airway - Endotracheal Tube 09/10/18 Oral (Active) Dressing/Packing:  Wound Abdomen-DRESSING TYPE: Topical skin adhesive/glue; Other (Comment) (joann pad) (09/10/18 1300) Splint/Cast:  ] Other:

## 2018-09-10 NOTE — BRIEF OP NOTE
BRIEF OPERATIVE NOTE Date of Procedure: 9/10/2018 Preoperative Diagnosis: ENDOMETRIAL HYPERPLASIA Postoperative Diagnosis: ENDOMETRIAL HYPERPLASIA Procedure(s): TOTAL LAPAROSCOPIC HYSTERECTOMY, BILATERAL SALPINGOOOPHORECTOMY Surgeon(s) and Role: Camila Sierra MD - Primary Surgical Assistant: Mukul Newman PA-SCOTT Surgical Staff: 
Circ-1: Chari Simmons RN 
Circ-Relief: Scarlett Patel RN Physician Assistant: IRINA Ha Scrub Tech-1: Michi Suarez Scrub RN-1: Andressa Booker RN Scrub RN-Relief: Scarlett Patel RN Event Time In Incision Start 1215 Incision Close Anesthesia: General  
Estimated Blood Loss: 25 cc Specimens:  
ID Type Source Tests Collected by Time Destination 1 : UTERUS,CERVIX, BILATERAL FALLOPIAN TUBES AND OVARIES Frozen Section Uterus with Bilateral Fallopian tubes and Kinjal Mcintosh MD 9/10/2018 1241 Pathology 1 : PELVIC WASHINGS Fresh Pelvis  Zackery Johnson MD 9/10/2018 1230 Cytology Findings: Adhesions of omentum to anterior abdominal wall. Normal appearing uterus, tubes, and ovaries. No evidence of malignancy on frozen section pathology. Complications: None Implants: * No implants in log * Zackery Johnson MD

## 2018-09-10 NOTE — OP NOTES
Gynecologic Oncology Operative Report    Peterson Holstein  9/10/2018    Pre-operative dx:  Endometrial hyperplasia    Post-operative dx:  Endometrial hyperplasia    Procedure: Total laparoscopic hysterectomy, bilateral salpingooophorectomy    Surgeon:  Aidan Thomas MD    Assistant:  Charis Boyer PA-C     Anesthesia:  GETA    EBL:  25 cc    Complications:  None    Implants:  None    Specimens:  ID Type Source Tests Collected by Time Destination   1 : UTERUS,CERVIX, BILATERAL FALLOPIAN TUBES AND OVARIES Frozen Section Uterus with Bilateral Fallopian tubes and Ovaries   Aidan Thomas MD 9/10/2018 1241 Pathology   1 : PELVIC WASHINGS Fresh Pelvis   Aidan Thomas MD 9/10/2018 1230 Cytology     Operative indications:  60 yo WF with postmenopausal bleeding. Endometrial biopsy revealed endometrial hyperplasia without atypia. I recommended laparoscopic hysterectomy with frozen section pathology to rule out malignancy. Operative findings:  Adhesions of omentum to anterior abdominal wall. Normal appearing uterus, tubes, and ovaries. No evidence of malignancy on frozen section pathology. Procedure in detail:  After the risks, benefits, indications, and alternatives of the procedure were discussed with the patient and informed consent was obtained, the patient was taken to the operating room. She was positively identified, administered general anesthesia, and then placed in the dorsal lithotomy position in [address] [place] [address]. An exam under anesthesia was performed. She was then prepped and draped in the usual fashion. A jarvis catheter was inserted, followed by a [employer]-Care uterine manipulator. We then proceeded with laparoscopy by grasping the umbilicus on either side with Allis clamps. A small incision was made at the base with an #11-blade scalpel. A 5 mm blade-less trocar was then directly inserted, with the camera in position to visualize safe entry.   Once proper placement was confirmed, the abdomen was then insufflated with carbon dioxide. A 5 mm blade-less trocar was then inserted in each lower quadrant, midway between the anterior superior iliac spine and the umbilicus. These trocars were inserted under direct visualization to ensure safe entry. The abdomen and pelvis were then examined, with the above mentioned findings noted. The omental adhesions were taken down sharply with the Harmonic Scalpel. Pelvic washings were obtained as well. The patient was then placed in Trendelenburg tilt and we then proceeded with the hysterectomy. The left round ligament was identified, then coagulated and transected with the Harmonic Scalpel, allowing entry into the retroperitoneal space. The vesico-uterine peritoneum was divided with the Harmonic Scalpel from the left side across the midline, allowing visualization of the ring of the V-Care manipulator anteriorly. We then identified the left ureter and bluntly developed the perirectal space. The left uterine artery was identified at its origin off of the hypogastric artery, and it was coagulated and transected with the Harmonic Scalpel at that point, with the ureter being held on traction medially to ensure its safety. The left ovarian vessels were then isolated and then coagulated and transected with the Harmonic Scalpel. The posterior leaf of the broad ligament was then divided with the Harmonic Scalpel up to the level of the uterine body. We then directed our attention to the right side of the pelvis. The right round ligament was identified and then coagulated and transected with the Harmonic Scalpel, allowing entry into the retroperitoneal space. The remaining vesico-uterine peritoneum was then divided with the Harmonic Scalpel on the right side. The right ureter was then identified and the perirectal space was bluntly developed. The right uterine artery was then identified at its origin off of the hypogastric artery.   It was coagulated and transected with the Harmonic Scalpel at that point, with the ureter being held on traction medially to ensure its safety. The right ovarian vessels were then isolated and then coagulated and transected with the Harmonic Scalpel. The posterior leaf of the broad ligament was then divided with the Harmonic Scalpel up to the level of the uterine body. We then moved anteriorly and the bladder was sharply dissected off of the lower uterine segment and cervix until it was well below the ring of the [employer]. The uterine arteries were then skeletonized bilaterally and then coagulated and transected with the Harmonic Scalpel at the level of the V-Care ring. A circumferential colpotomy was then performed by using the active blade of the Harmonic Scalpel to cut down onto the V-Care ring. Once the colpotomy was completed, the specimen was delivered transvaginally and sent to pathology. A bulb syringe was then placed into the vagina to maintain pneumoperitoneum for vaginal cuff closure. The cuff was then reapproximated with three figure-of-eight stitches of 2-0 PDS suture using the LSI RD-180 suturing device. The sutures were secured in place with the LSI Ti-Knot device. Excellent reapproximation and hemostasis was noted. The pelvis was then irrigated and all pedicles inspected. Once satisfied with hemostasis, the gas was then allowed to escape from the abdomen and the trocars were removed. She was then taken out of Trendelenburg tilt. The incision sites were closed with a stitch of 4-0 Monocryl, followed by a layer of Dermabond. The bulb syringe was then removed from the vagina. The patient was taken out of stirrups, awakened from anesthesia, and taken to the recovery room in stable condition. All instrument, sponge, and needle counts were correct.         Nick Holcomb MD  9/10/2018  1:19 PM

## 2018-09-10 NOTE — PROGRESS NOTES
Bedside and Verbal shift change report given to Starla Poon. (oncoming nurse) by Armando Archibald (offgoing nurse). Report included the following information SBAR, Kardex, OR Summary, Procedure Summary, MAR and Med Rec Status.

## 2018-09-10 NOTE — IP AVS SNAPSHOT
[address] [facility] 13 
[phone] Patient: Jean Patterson MRN: [id number] LMX:[id number] About your hospitalization You were admitted on:  September 10, 2018 You last received care in the:  [address] [place] [address] You were discharged on:  September 11, 2018 Why you were hospitalized Your primary diagnosis was:  Not on File Your diagnoses also included:  Endometrial Hyperplasia Follow-up Information Follow up With Details Comments Contact Info None   None ([phone]) Patient stated that they have no PCP Zackery Johnson MD In 4 weeks For wound re-check [address] [address] [address] [place] [address] 13 
[phone] Discharge Orders None A check ray indicates which time of day the medication should be taken. My Medications START taking these medications Instructions Each Dose to Equal  
 Morning Noon Evening Bedtime  
 traMADol 50 mg tablet Commonly known as:  ULTRAM  
   
Your last dose was: Your next dose is: Take 1 Tab by mouth every six (6) hours as needed for Pain. Max Daily Amount: 200 mg.  
 50 mg CONTINUE taking these medications Instructions Each Dose to Equal  
 Morning Noon Evening Bedtime  
 ibuprofen 200 mg Cap Your last dose was: Your next dose is: Take 4 Tabs by mouth as needed. 4 Tab TYLENOL 325 mg tablet Generic drug:  acetaminophen Your last dose was: Your next dose is: Take 650 mg by mouth every four (4) hours as needed for Pain. 650 mg Where to Get Your Medications Information on where to get these meds will be given to you by the nurse or doctor. ! Ask your nurse or doctor about these medications  
  traMADol 50 mg tablet Opioid Education Prescription Opioids: What You Need to Know: 
 
Prescription opioids can be used to help relieve moderate-to-severe pain and are often prescribed following a surgery or injury, or for certain health conditions. These medications can be an important part of treatment but also come with serious risks. Opioids are strong pain medicines. Examples include hydrocodone, oxycodone, fentanyl, and morphine. Heroin is an example of an illegal opioid. It is important to work with your health care provider to make sure you are getting the safest, most effective care. WHAT ARE THE RISKS AND SIDE EFFECTS OF OPIOID USE? Prescription opioids carry serious risks of addiction and overdose, especially with prolonged use. An opioid overdose, often marked by slow breathing, can cause sudden death. The use of prescription opioids can have a number of side effects as well, even when taken as directed. · Tolerance-meaning you might need to take more of a medication for the same pain relief · Physical dependence-meaning you have symptoms of withdrawal when the medication is stopped. Withdrawal symptoms can include nausea, sweating, chills, diarrhea, stomach cramps, and muscle aches. Withdrawal can last up to several weeks, depending on which drug you took and how long you took it. · Increased sensitivity to pain · Constipation · Nausea, vomiting, and dry mouth · Sleepiness and dizziness · Confusion · Depression · Low levels of testosterone that can result in lower sex drive, energy, and strength · Itching and sweating RISKS ARE GREATER WITH:      
· History of drug misuse, substance use disorder, or overdose · Mental health conditions (such as depression or anxiety) · Sleep apnea · Older age (72 years or older) · Pregnancy Avoid alcohol while taking prescription opioids. Also, unless specifically advised by your health care provider, medications to avoid include: · Benzodiazepines (such as Xanax or Valium) · Muscle relaxants (such as Soma or Flexeril) · Hypnotics (such as Ambien or Lunesta) · Other prescription opioids KNOW YOUR OPTIONS Talk to your health care provider about ways to manage your pain that don't involve prescription opioids. Some of these options may actually work better and have fewer risks and side effects. Options may include: 
· Pain relievers such as acetaminophen, ibuprofen, and naproxen · Some medications that are also used for depression or seizures · Physical therapy and exercise · Counseling to help patients learn how to cope better with triggers of pain and stress. · Application of heat or cold compress · Massage therapy · Relaxation techniques Be Informed Make sure you know the name of your medication, how much and how often to take it, and its potential risks & side effects. IF YOU ARE PRESCRIBED OPIOIDS FOR PAIN: 
· Never take opioids in greater amounts or more often than prescribed. Remember the goal is not to be pain-free but to manage your pain at a tolerable level. · Follow up with your primary care provider to: · Work together to create a plan on how to manage your pain. · Talk about ways to help manage your pain that don't involve prescription opioids. · Talk about any and all concerns and side effects. · Help prevent misuse and abuse. · Never sell or share prescription opioids · Help prevent misuse and abuse. · Store prescription opioids in a secure place and out of reach of others (this may include visitors, children, friends, and family). · Safely dispose of unused/unwanted prescription opioids: Find your community drug take-back program or your pharmacy mail-back program, or flush them down the toilet, following guidance from the Food and Drug Administration (www.fda.gov/Drugs/ResourcesForYou). · Visit www.cdc.gov/drugoverdose to learn about the risks of opioid abuse and overdose. · If you believe you may be struggling with addiction, tell your health care provider and ask for guidance or call [address] at [phone]. Discharge Instructions [address], [address] William Gao 
P [phone]  F [phone] Patient Discharge Instructions Fox Lambert / [id number] : 1960 Admitted 9/10/2018 Discharged: 2018 Take Home Medications No current facility-administered medications on file prior to encounter. No current outpatient prescriptions on file prior to encounter. · It is important that you take the medication exactly as they are prescribed. · Keep your medication in the bottles provided by the pharmacist and keep a list of the medication names, dosages, and times to be taken in your wallet. · Do not take other medications without consulting your doctor. What to do at [facility] Recommended diet: Regular, stay hydrated. You should take a stool softener such as colace for constipation. If constipation persists for >24 hours you should take a dose of Milk of Magnesia. Call if your constipation persists. If you have had a hysterectomy or vaginal surgery you may experience vaginal spotting. This is acceptable. Should the bleeding require more that 4 pads a day please call our office. Nothing per vagina for 6-8 weeks. Recommended activity: No driving for 1 week and/or while on pain medication Walk at least 6 times per day Showers okay, no tub bathing/swimming for two weeks Activity as able, stairs okay. Since you had a laparoscopic procedure, no lifting greater than 25 lbs for 3 weeks.  
 
 
If you experience any of the following persisting symptoms: 
 
Nausea/vomiting, fever > 101 F, diarrhea/constipation, increasing pain despite medication, increasing vaginal discharge or any concerns, please call our office. Follow-up with Dr. Lila Vazquez in 4 weeks. Call [phone] for an appointment. Information obtained by : 
I understand that if any problems occur once I am at home I am to contact my physician. I understand and acknowledge receipt of the instructions indicated above. Physician's or R.N.'s Signature                                                                  Date/Time Patient or Representative Signature                                                          Date/Time Introducing [facility] SERVICES! Jorden Portillo introduces [employer] patient portal. Now you can access parts of your medical record, email your doctor's office, and request medication refills online. 1. In your internet browser, go to [email or web address] 2. Click on the First Time User? Click Here link in the Sign In box. You will see the New Member Sign Up page. 3. Enter your [employer] Access Code exactly as it appears below. You will not need to use this code after youve completed the sign-up process. If you do not sign up before the expiration date, you must request a new code. · [employer]zi Access Code: [email or web address] Expires: 11/8/2018  9:29 AM 
 
4. Enter the last four digits of your Social Security Number (xxxx) and Date of Birth (mm/dd/yyyy) as indicated and click Submit. You will be taken to the next sign-up page. 5. Create a Cozi ID. This will be your Cozi login ID and cannot be changed, so think of one that is secure and easy to remember. 6. Create a Cozi password. You can change your password at any time. 7. Enter your Password Reset Question and Answer.  This can be used at a later time if you forget your password. 8. Enter your e-mail address. You will receive e-mail notification when new information is available in [address] 9. Click Sign Up. You can now view and download portions of your medical record. 10. Click the Download Summary menu link to download a portable copy of your medical information. If you have questions, please visit the Frequently Asked Questions section of the [employer] website. Remember, [employer] is NOT to be used for urgent needs. For medical emergencies, dial 911. Now available from your iPhone and Android! Introducing Harvey Blum As a [employer] patient, I wanted to make you aware of our electronic visit tool called Harvey Blum. [employer] 24/7 allows you to connect within minutes with a medical provider 24 hours a day, seven days a week via a mobile device or tablet or logging into a secure website from your computer. You can access Harvey Blum from anywhere in the [place]. A virtual visit might be right for you when you have a simple condition and feel like you just dont want to get out of bed, or cant get away from work for an appointment, when your regular [employer] provider is not available (evenings, weekends or holidays), or when youre out of town and need minor care. Electronic visits cost only $49 and if the [employer] 24/7 provider determines a prescription is needed to treat your condition, one can be electronically transmitted to a nearby pharmacy*. Please take a moment to enroll today if you have not already done so. The enrollment process is free and takes just a few minutes. To enroll, please download the [employer] 24/7 barrett to your tablet or phone, or visit [email or web address] to enroll on your computer.    
And, as an [address] [place] [address] patient with a [employer] account, the results of your visits will be scanned into your electronic medical record and your primary care provider will be able to view the scanned results. We urge you to continue to see your regular [facility] provider for your ongoing medical care. And while your primary care provider may not be the one available when you seek a [employer] virtual visit, the peace of mind you get from getting a real diagnosis real time can be priceless. For more information on [employer], view our Frequently Asked Questions (FAQs) at [email or web address]. Sincerely, 
 
Winston Morales MD 
Chief Medical Officer [address] *:  certain medications cannot be prescribed via [employer] Providers Seen During Your Hospitalization Provider Specialty Primary office phone Pal Parkinson MD Gynecologic Oncology [phone] Your Primary Care Physician (PCP) Primary Care Physician Office Phone Office Fax NONE ** None ** ** None ** You are allergic to the following No active allergies Recent Documentation Height Weight Breastfeeding? BMI OB Status Smoking Status 1.6 m 104.3 kg No 40.74 kg/m2 Postmenopausal Never Smoker Emergency Contacts Name Discharge Info Relation Home Work Mobile Stephan Soliman DISCHARGE CAREGIVER [3] Spouse [3] [phone] Varun Swift  Child [2] [phone] Papo Ladd CAREGIVER [3] Daughter [21] [phone] Patient Belongings The following personal items are in your possession at time of discharge: 
  Dental Appliances: None  Visual Aid: Glasses, At bedside      Home Medications: None   Jewelry: None  Clothing: At bedside    Other Valuables: None Please provide this summary of care documentation to your next provider. Signatures-by signing, you are acknowledging that this After Visit Summary has been reviewed with you and you have received a copy.   
  
 
  
    
    
 Patient Signature: ____________________________________________________________ Date:  ____________________________________________________________  
  
Josph Perfect Provider Signature:  ____________________________________________________________ Date:  ____________________________________________________________

## 2018-09-10 NOTE — ANESTHESIA PREPROCEDURE EVALUATION
Anesthetic History No history of anesthetic complications Review of Systems / Medical History Patient summary reviewed, nursing notes reviewed and pertinent labs reviewed Pulmonary Within defined limits Neuro/Psych Within defined limits Cardiovascular Within defined limits Exercise tolerance: >4 METS 
  
GI/Hepatic/Renal 
Within defined limits Endo/Other Morbid obesity Other Findings Comments: Endometrial hyperplasia Physical Exam 
 
Airway Mallampati: III 
TM Distance: 4 - 6 cm Neck ROM: normal range of motion, short neck Mouth opening: Normal 
 
 Cardiovascular Regular rate and rhythm,  S1 and S2 normal,  no murmur, click, rub, or gallop Dental 
No notable dental hx Pulmonary Breath sounds clear to auscultation Abdominal 
GI exam deferred Other Findings Anesthetic Plan ASA: 3 Anesthesia type: general 
 
 
 
 
Induction: Intravenous Anesthetic plan and risks discussed with: Patient

## 2018-09-10 NOTE — PROGRESS NOTES
TRANSFER - IN REPORT: 
 
Verbal report received from Elayne Stephens RN(name) on [facility]  being received from PACU(unit) for routine progression of care Report consisted of patients Situation, Background, Assessment and  
Recommendations(SBAR). Information from the following report(s) SBAR, Kardex, Intake/Output, MAR and Recent Results was reviewed with the receiving nurse. Opportunity for questions and clarification was provided. Assessment completed upon patients arrival to unit and care assumed.

## 2018-09-10 NOTE — H&P
[address] [facility], [address] 
P [phone]  F [phone] Patient ID: 
Name:  Joe Lopez MRN:  [id number] :  1960/58 y.o. Date:  9/10/2018 HISTORY OF PRESENT ILLNESS: 
Joe Lopez is a 59 y.o.  postmenopausal female who is being seen for endometrial hyperplasia. She is referred by Dr. Rosemary Liu. She presented to Dr. Oswaldo Mclaughlin complaining of some vaginal bleeding, which had only been ongoing for about a week. Dr. Oswaldo Mclaughlin performed an endometrial biopsy. Pathology revealed complex hyperplasia without atypia, but a well differentiated endometrial cancer could not be excluded. I have been asked to see her in consultation for further evaluation and management.   
   
  
ROS: 
 and GI review:  Negative Cardiopulmonary review:  Negative Musculoskeletal:  Negative 
  
A comprehensive review of systems was negative except for that written in the History of Present Illness. , 10 point ROS 
  
  
OB/GYN ROS: 
  section x 4 Problem List: 
Patient Active Problem List  
 Diagnosis Date Noted  Endometrial hyperplasia 2018  Obesity, morbid ([address] 08/10/2018 PMH: 
Past Medical History:  
Diagnosis Date  Elevated BP   
  
PSH: 
Past Surgical History:  
Procedure Laterality Date  HX  SECTION    
 X4  
 HX HEENT    
 HX TONSILLECTOMY Social History: 
Social History Substance Use Topics  Smoking status: Never Smoker  Smokeless tobacco: Never Used  Alcohol use No  
  
Family History: 
Family History Problem Relation Age of Onset  Hypertension Mother  Cancer Brother   
  lymphoma  Cancer Maternal Uncle   
  unknown  Hypertension Maternal Grandmother  Hypertension Maternal Grandfather  Cancer Maternal Grandfather   
  stomach cancer  Cancer Other   
  breast cancer (cousin)  Anesth Problems Neg Hx Medications: (reviewed) No current facility-administered medications for this encounter. Current Outpatient Prescriptions Medication Sig  
 acetaminophen (TYLENOL) 325 mg tablet Take 650 mg by mouth every four (4) hours as needed for Pain.  ibuprofen 200 mg cap Take 4 Tabs by mouth as needed. Allergies: (reviewed) No Known Allergies OBJECTIVE: 
 
Physical Exam: VITAL SIGNS: There were no vitals filed for this visit. There is no height or weight on file to calculate BMI. GENERAL JAK: Conversant, alert, oriented. No acute distress. HEENT: HEENT. No thyroid enlargement. No JVD. Neck: Supple without restrictions. RESPIRATORY: Clear to auscultation and percussion to the bases. No CVAT. CARDIOVASC: RRR without murmur/rub. GASTROINT: soft, non-tender, without masses or organomegaly MUSCULOSKEL: no joint tenderness, deformity or swelling EXTREMITIES: extremities normal, atraumatic, no cyanosis or edema PELVIC: Vulva and vagina appear normal. Bimanual exam reveals normal uterus and adnexa. RECTAL: Deferred ALVARO SURVEY: No suspicious lymphadenopathy or edema noted. NEURO: Grossly intact. No acute deficit. Lab Results Component Value Date/Time WBC 8.1 08/30/2018 02:22 PM  
 HGB 14.3 08/30/2018 02:22 PM  
 HCT 44.1 08/30/2018 02:22 PM  
 PLATELET 598 34/77/2935 02:22 PM  
 MCV 85.3 08/30/2018 02:22 PM  
 
Lab Results Component Value Date/Time  Sodium 140 08/30/2018 02:22 PM  
 Potassium 4.0 08/30/2018 02:22 PM  
 Chloride 103 08/30/2018 02:22 PM  
 CO2 27 08/30/2018 02:22 PM  
 Anion gap 10 08/30/2018 02:22 PM  
 Glucose 103 (H) 08/30/2018 02:22 PM  
 BUN 19 08/30/2018 02:22 PM  
 Creatinine 1.01 08/30/2018 02:22 PM  
 BUN/Creatinine ratio 19 08/30/2018 02:22 PM  
 GFR est AA >60 08/30/2018 02:22 PM  
 GFR est non-AA 56 (L) 08/30/2018 02:22 PM  
 Calcium 9.0 08/30/2018 02:22 PM  
 
 
Imaging: 
None 
  
  
IMPRESSION/PLAN: 
 Ford Tam is a 59 y.o. female with a working diagnosis of complex endometrial hyperplasia. I reviewed with Ford Tam her medical records, physical exam, and review of symptoms. I explained to her and her  the difficulty differentiating between hyperplasia and malignancy on biopsy and discussed the possibility of cancer. I recommended TLH, BSO with frozen section pathology to rule out malignancy. If cancer is identified, we will proceed with staging lymphadenectomy based upon the pathology findings. She was counseled on the risks, benefits, indications, and alternatives of surgery. Her questions were answered and she wishes to proceed as planned.    
 
 
 
Signed By: Rod Palacios MD   
 9/10/2018/7:39 AM

## 2018-09-11 VITALS
BODY MASS INDEX: 40.75 KG/M2 | DIASTOLIC BLOOD PRESSURE: 67 MMHG | SYSTOLIC BLOOD PRESSURE: 125 MMHG | WEIGHT: 230 LBS | TEMPERATURE: 98 F | RESPIRATION RATE: 16 BRPM | OXYGEN SATURATION: 95 % | HEART RATE: 89 BPM | HEIGHT: 63 IN

## 2018-09-11 LAB
ANION GAP SERPL CALC-SCNC: 9 MMOL/L (ref 5–15)
BUN SERPL-MCNC: 13 MG/DL (ref 6–20)
BUN/CREAT SERPL: 15 (ref 12–20)
CALCIUM SERPL-MCNC: 7.7 MG/DL (ref 8.5–10.1)
CHLORIDE SERPL-SCNC: 106 MMOL/L (ref 97–108)
CO2 SERPL-SCNC: 25 MMOL/L (ref 21–32)
CREAT SERPL-MCNC: 0.84 MG/DL (ref 0.55–1.02)
ERYTHROCYTE [DISTWIDTH] IN BLOOD BY AUTOMATED COUNT: 13.1 % (ref 11.5–14.5)
GLUCOSE SERPL-MCNC: 97 MG/DL (ref 65–100)
HCT VFR BLD AUTO: 38.6 % (ref 35–47)
HGB BLD-MCNC: 12.5 G/DL (ref 11.5–16)
MCH RBC QN AUTO: 27.3 PG (ref 26–34)
MCHC RBC AUTO-ENTMCNC: 32.4 G/DL (ref 30–36.5)
MCV RBC AUTO: 84.3 FL (ref 80–99)
NRBC # BLD: 0 K/UL (ref 0–0.01)
NRBC BLD-RTO: 0 PER 100 WBC
PLATELET # BLD AUTO: 296 K/UL (ref 150–400)
PMV BLD AUTO: 9.2 FL (ref 8.9–12.9)
POTASSIUM SERPL-SCNC: 4.3 MMOL/L (ref 3.5–5.1)
RBC # BLD AUTO: 4.58 M/UL (ref 3.8–5.2)
SODIUM SERPL-SCNC: 140 MMOL/L (ref 136–145)
WBC # BLD AUTO: 12.6 K/UL (ref 3.6–11)

## 2018-09-11 PROCEDURE — 99218 HC RM OBSERVATION: CPT

## 2018-09-11 PROCEDURE — 74011250636 HC RX REV CODE- 250/636: Performed by: OBSTETRICS & GYNECOLOGY

## 2018-09-11 PROCEDURE — 80048 BASIC METABOLIC PNL TOTAL CA: CPT | Performed by: OBSTETRICS & GYNECOLOGY

## 2018-09-11 PROCEDURE — 74011000258 HC RX REV CODE- 258: Performed by: OBSTETRICS & GYNECOLOGY

## 2018-09-11 PROCEDURE — 85027 COMPLETE CBC AUTOMATED: CPT | Performed by: OBSTETRICS & GYNECOLOGY

## 2018-09-11 PROCEDURE — 36415 COLL VENOUS BLD VENIPUNCTURE: CPT | Performed by: OBSTETRICS & GYNECOLOGY

## 2018-09-11 RX ORDER — TRAMADOL HYDROCHLORIDE 50 MG/1
50 TABLET ORAL
Qty: 20 TAB | Refills: 0 | Status: SHIPPED | OUTPATIENT
Start: 2018-09-11 | End: 2019-07-29

## 2018-09-11 RX ADMIN — Medication 10 ML: at 06:56

## 2018-09-11 RX ADMIN — KETOROLAC TROMETHAMINE 30 MG: 30 INJECTION, SOLUTION INTRAMUSCULAR at 06:55

## 2018-09-11 RX ADMIN — CEFAZOLIN SODIUM 1 G: 1 INJECTION, POWDER, FOR SOLUTION INTRAMUSCULAR; INTRAVENOUS at 05:34

## 2018-09-11 RX ADMIN — SODIUM CHLORIDE 100 ML/HR: 900 INJECTION, SOLUTION INTRAVENOUS at 00:56

## 2018-09-11 RX ADMIN — KETOROLAC TROMETHAMINE 30 MG: 30 INJECTION, SOLUTION INTRAMUSCULAR at 00:57

## 2018-09-11 NOTE — ANESTHESIA POSTPROCEDURE EVALUATION
Post-Anesthesia Evaluation and Assessment Patient: Chris Mcnair MRN: [id number]  SSN: [id number] YOB: 1960  Age: 59 y.o. Sex: female Cardiovascular Function/Vital Signs Visit Vitals  /80 (BP 1 Location: Left arm, BP Patient Position: At rest)  Pulse 70  Temp 36.5 °C (97.7 °F)  Resp 16  
 Ht 5' 3\" (1.6 m)  Wt 104.3 kg (230 lb)  SpO2 99%  BMI 40.74 kg/m2 Patient is status post general anesthesia for Procedure(s): 
TOTAL LAPAROSCOPIC HYSTERECTOMY, BILATERAL SALPINGO OOPHRECTOM. Repair of vaginal tear. Nausea/Vomiting: None Postoperative hydration reviewed and adequate. Pain: 
Pain Scale 1: Numeric (0 - 10) (09/10/18 1934) Pain Intensity 1: 4 (09/10/18 1934) Managed Neurological Status:  
Neuro (WDL): Within Defined Limits (09/10/18 1716) Neuro Neurologic State: Sleeping (09/10/18 1446) LUE Motor Response: Purposeful (09/10/18 1436) LLE Motor Response: Purposeful (09/10/18 1436) RUE Motor Response: Purposeful (09/10/18 1436) RLE Motor Response: Purposeful (09/10/18 1436) At baseline Mental Status and Level of Consciousness: Arousable Pulmonary Status:  
O2 Device: Nasal cannula (09/10/18 1716) Adequate oxygenation and airway patent Complications related to anesthesia: None Post-anesthesia assessment completed. No concerns Signed By: Hanny Vegas MD   
 September 10, 2018

## 2018-09-11 NOTE — PROGRESS NOTES
Bedside and Verbal shift change report given to Eddie Hearn (oncoming nurse) by Navya Evangelista RN (offgoing nurse). Report included the following information SBAR, Kardex, OR Summary, Procedure Summary, Intake/Output and MAR.  
 
0725- MD at bedside, patient has no concerns or complaints 0830- assessment complete, discharge orders placed 1000- I have reviewed discharge instructions with the patient and spouse. The patient and spouse verbalized understanding. Discharge medications reviewed with patient and spouse and appropriate educational materials and side effects teaching were provided. 1100- patient DC to home

## 2018-09-11 NOTE — DISCHARGE INSTRUCTIONS
[address] [facility] [address]  P [phone]  F [phone]      Patient Discharge Instructions    Fox Lambert / [id number] : 1960    Admitted 9/10/2018 Discharged: 2018     Take Home Medications     No current facility-administered medications on file prior to encounter. No current outpatient prescriptions on file prior to encounter. · It is important that you take the medication exactly as they are prescribed. · Keep your medication in the bottles provided by the pharmacist and keep a list of the medication names, dosages, and times to be taken in your wallet. · Do not take other medications without consulting your doctor. What to do at Home    Recommended diet: Regular, stay hydrated. You should take a stool softener such as colace for constipation. If constipation persists for >24 hours you should take a dose of Milk of Magnesia. Call if your constipation persists. If you have had a hysterectomy or vaginal surgery you may experience vaginal spotting. This is acceptable. Should the bleeding require more that 4 pads a day please call our office. Nothing per vagina for 6-8 weeks. Recommended activity:   No driving for 1 week and/or while on pain medication  Walk at least 6 times per day  Showers okay, no tub bathing/swimming for two weeks  Activity as able, stairs okay. Since you had a laparoscopic procedure, no lifting greater than 25 lbs for 3 weeks. If you experience any of the following persisting symptoms:    Nausea/vomiting, fever > 101 F, diarrhea/constipation, increasing pain despite medication, increasing vaginal discharge or any concerns, please call our office. Follow-up with Dr. Elaine Toure in 4 weeks. Call [phone] for an appointment. Information obtained by :  I understand that if any problems occur once I am at home I am to contact my physician.     I understand and acknowledge receipt of the instructions indicated above.                                                                                                                                            Physician's or R.N.'s Signature                                                                  Date/Time                                                                                                                                              Patient or Representative Signature                                                          Date/Time

## 2018-09-11 NOTE — PROGRESS NOTES
Bedside and Verbal shift change report given to JUAN Michel (oncoming nurse) by Kristofer Valencia RN (offgoing nurse). Report included the following information SBAR, Kardex, OR Summary, Procedure Summary, Intake/Output, MAR, Accordion and Recent Results.

## 2018-09-11 NOTE — PROGRESS NOTES
[address] [facility], [facility] [address] [place] [address] [address] [phone]  F [phone] Patient Name: Chris Mcnair Admit Date: 9/10/2018 OR Date: 9/10/2018 Visit Date: 9/11/2018 SUBJECTIVE: 
Pt did well overnight Tolerating diet well and ambulating in hallway Mcnally removed without difficulty and voided without difficulty No vag bleeding or spotting at this point OBJECTIVE: 
 
Patient Vitals for the past 24 hrs: 
 Temp Pulse Resp BP SpO2  
09/11/18 0507 98.6 °F (37 °C) 76 16 128/71 95 % 09/11/18 0009 98.3 °F (36.8 °C) 88 16 126/63 98 % 09/10/18 2015 97.7 °F (36.5 °C) 70 16 140/80 99 % 09/10/18 1832 97.7 °F (36.5 °C) 68 16 145/84 98 % 09/10/18 1730 97.4 °F (36.3 °C) 77 15 136/77 99 % 09/10/18 1633 97.5 °F (36.4 °C) (!) 55 16 150/84 98 % 09/10/18 1532 97.4 °F (36.3 °C) 61 16 146/87 99 % 09/10/18 1500 - 77 15 148/80 100 % 09/10/18 1445 - 69 14 146/82 100 % 09/10/18 1436 - - - - 99 % 09/10/18 1430 98.3 °F (36.8 °C) 71 20 148/78 100 % 09/10/18 1415 - 61 17 150/77 100 % 09/10/18 1400 - 70 15 150/77 100 % 09/10/18 1355 - 74 17 147/78 99 % 09/10/18 1350 - 65 14 128/78 100 % 09/10/18 1347 97.5 °F (36.4 °C) 66 12 129/71 98 % 09/10/18 1345 - 72 13 129/71 99 % 09/10/18 1343 - 70 13 139/75 99 % 09/10/18 1059 98.1 °F (36.7 °C) 75 17 156/85 99 % Date 09/10/18 0700 - 09/11/18 9633 09/11/18 0700 - 09/12/18 6423 Shift 0700-1859 1900-0659 24 Hour Total 0700-1859 1900-0659 24 Hour Total  
I 
N 
T 
A 
K 
E 
 I.V. 
(mL/kg/hr) 1000 
(0.8) 1556.7 
(1.2) 2556.7 
(1) I.V. 200  200 Volume (0.9% sodium chloride infusion)  1506.7 1506.7 Volume (lactated Ringers infusion) 800  800 Volume (ceFAZolin (ANCEF) 1 g in 0.9% sodium chloride (MBP/ADV) 50 mL)  50 50 Shift Total 
(mL/kg) 1000 
(9.6) 1556.7 
(14.9) 2556.7 
(24.5) O 
U T 
P 
U Ruth Lowing Urine (mL/kg/hr) 500 
(0.4) 545 
(0.4) 1045 
(0.4) Urine Output 100  100 Urine Output (mL) ([REMOVED] Urinary Catheter 09/10/18 2- way; Mcnally) 400 545 945 Emesis/NG output  100 100 Emesis  100 100 Blood 10  10 Estimated Blood Loss 10  10 Shift Total 
(mL/kg) 510 
(4.9) 645 
(6.2) 1155 
(11.1)  911.7 1401.7 Weight (kg) 104.3 104.3 104.3 104.3 104.3 104. 3 Physical Exam 
 General: A&O X3 in NAD HEENT: Sclera anicteric, Mucosa pink, moist  
Neck: No JVD or adenopathy appreciated. Port Site: without redness, swelling or tenderness Heart: Regular without M/R/G Lungs: CTA Bilat without wheezing or rales. No cough noted Abd: Soft, non-tender to gentle palpation + BS throughout Ext: Without edema and + pedal pulses bilat Neuro: grossly intact Data Review Lab Results Component Value Date/Time WBC 12.6 (H) 09/11/2018 05:14 AM  
 ABS. NEUTROPHILS 4.7 08/30/2018 02:22 PM  
 HGB 12.5 09/11/2018 05:14 AM  
 HCT 38.6 09/11/2018 05:14 AM  
 MCV 84.3 09/11/2018 05:14 AM  
 MCH 27.3 09/11/2018 05:14 AM  
 PLATELET 621 08/46/2047 05:14 AM  
 
Lab Results Component Value Date/Time Sodium 140 09/11/2018 05:14 AM  
 Potassium 4.3 09/11/2018 05:14 AM  
 Chloride 106 09/11/2018 05:14 AM  
 CO2 25 09/11/2018 05:14 AM  
 Glucose 97 09/11/2018 05:14 AM  
 BUN 13 09/11/2018 05:14 AM  
 Creatinine 0.84 09/11/2018 05:14 AM  
 Calcium 7.7 (L) 09/11/2018 05:14 AM  
 Albumin 3.6 08/30/2018 02:22 PM  
 Bilirubin, total 0.3 08/30/2018 02:22 PM  
 AST (SGOT) 20 08/30/2018 02:22 PM  
 ALT (SGPT) 25 08/30/2018 02:22 PM  
 Alk. phosphatase 104 08/30/2018 02:22 PM  
 
IMPRESSION/PLAN: 
 
1 Day Post-Op Procedure(s): 
TOTAL LAPAROSCOPIC HYSTERECTOMY, BILATERAL SALPINGO OOPHRECTOM. Repair of vaginal tear for ENDOMETRIAL HYPERPLASIA Oncologic:  Wait final path, but frozen section benign Heme/CV:  Stable Renal:  Stable, good urine output FEN/GI:  tolerating diet weill and passing flatus.  Discussed low calcium and to follow up with PCP ID/Wound:  Discussed keeping clean and dry PPX:  Mobilize frequently at home Dispostion:  home today with follow up in one month Nestora Cockayne, NP

## 2018-09-19 ENCOUNTER — TELEPHONE (OUTPATIENT)
Dept: OBGYN CLINIC | Age: 58
End: 2018-09-19

## 2018-09-19 NOTE — TELEPHONE ENCOUNTER
Called to check on patient; s/p surgery with Dr. Rayo Lazcano on 9/10. Patient verbalized she is doing well and was very happy with care received by Dr. Ynes Zapata and Dr. Rayo Lazcano. Patient to follow up with Dr. Rayo Lazcano in 4 weeks and per his recommendation, if no need to see Dr. Ynes Zapata sooner, she may follow up for annual exam in 1 year. Patient verbalized understanding.

## 2018-10-11 ENCOUNTER — OFFICE VISIT (OUTPATIENT)
Dept: GYNECOLOGY | Age: 58
End: 2018-10-11

## 2018-10-11 VITALS
DIASTOLIC BLOOD PRESSURE: 76 MMHG | HEIGHT: 63 IN | BODY MASS INDEX: 42.56 KG/M2 | SYSTOLIC BLOOD PRESSURE: 141 MMHG | WEIGHT: 240.2 LBS

## 2018-10-11 DIAGNOSIS — C54.1 ENDOMETRIAL CANCER (HCC): Primary | ICD-10-CM

## 2018-10-11 NOTE — PROGRESS NOTES
[facility]YN[facility]IC ONCOLOGY  [address] [facility] [place] [address], [address]  P [phone]  F [phone]    Postoperative Office Note  Patient ID:  Name: Ketan Mckeon  MRM: [id number]  : 1960/58 y.o. Date: 10/11/2018    Diagnosis:     ICD-10-CM ICD-9-CM    1. Endometrial cancer ([facility] C54.1 182.0        Problem List:   Patient Active Problem List   Diagnosis Code    Obesity, morbid ([facility] [address] E66.01    Endometrial hyperplasia N85.00    Endometrial cancer ([facility] [address] [facility] C54.1       SUBJECTIVE:    Ketan Mckeon is a 59 y.o. female who presents for followup postoperative care following TLH, BSO, for endometrial hyperplasia. Operative findings:  Adhesions of omentum to anterior abdominal wall. Normal appearing uterus, tubes, and ovaries. No evidence of malignancy on frozen section pathology. The patient's pathology revealed: FINAL PATHOLOGIC DIAGNOSIS   Uterus, cervix, bilateral ovaries and fallopian tubes, hysterectomy and salpingo-oophorectomy:   Endometrial adenocarcinoma, endometrioid type, FIGO grade 1, arising in a background of complex atypical hyperplasia   Adenomyosis   Submucosal and intramural leiomyomas, up to 0.4 cm in greatest dimension   Benign endometrial polyp, 1.4 cm   ENDOMETRIUM   SYNOPTIC REPORT   SPECIMEN   Procedure:  Total abdominal hysterectomy and bilateral salpingo-oophorectomy   TUMOR   Histologic Type: Endometrioid carcinoma, NOS   Histologic Grade: FIGO grade 1   Tumor Extent   Myometrial Invasion: Present   Depth of Invasion in Millimeters: 1 Millimeters (mm)   Myometrial Thickness in Millimeters: 25 Millimeters (mm)   Percentage of Myometrial Invasion: 4 %   Adenomyosis: Present, uninvolved by carcinoma   Uterine Serosa Involvement: Not identified   Cervical Stromal Involvement: Not identified   Other Tissue / Organ Involvement: Not identified   Accessory Findings   Lymphovascular Invasion: Not identified   MARGINS   LYMPH NODES   Regional Lymph Nodes: No lymph nodes submitted or found   PATHOLOGIC STAGE CLASSIFICATION (pTNM, AJCC 8th Edition)   Primary Tumor (pT): pT1a   Regional Lymph Nodes (pN)   Category (pN): pNX     Currently she has no problems with eating, bowel movements, or voiding. Appetite is good. Eating a regular diet without difficulty. Bowel movements are regular. The patient is not having any pain. Medications:     Current Outpatient Prescriptions on File Prior to Visit   Medication Sig Dispense Refill    traMADol (ULTRAM) 50 mg tablet Take 1 Tab by mouth every six (6) hours as needed for Pain. Max Daily Amount: 200 mg. 20 Tab 0    acetaminophen (TYLENOL) 325 mg tablet Take 650 mg by mouth every four (4) hours as needed for Pain.  ibuprofen 200 mg cap Take 4 Tabs by mouth as needed. No current facility-administered medications on file prior to visit. Allergies:   No Known Allergies  Past Medical History:   Diagnosis Date    Elevated BP      Past Surgical History:   Procedure Laterality Date    HX  SECTION      X4    HX HEENT      HX TONSILLECTOMY       Social History     Social History    Marital status:      Spouse name: N/A    Number of children: N/A    Years of education: N/A     Occupational History    Not on file.      Social History Main Topics    Smoking status: Never Smoker    Smokeless tobacco: Never Used    Alcohol use No    Drug use: No    Sexual activity: Yes     Other Topics Concern    Not on file     Social History Narrative       OBJECTIVE:    Vitals:   Vitals:    10/11/18 1438   BP: 141/76   Weight: 240 lb 3.2 oz (109 kg)   Height: 5' 2.99\" (1.6 m)     Physical Examination:     General:  alert, cooperative, no distress   Lungs: lungs clear to auscultation   Cardiac: Regular rate and rhythm   Abdomen: soft, bowel sounds active, non-tender  No CVA tenderness   Incision: healing well   Pelvic: External genitalia: normal general appearance  Urinary system: urethral meatus normal  Vaginal: normal without tenderness, induration or masses  Cervix: absent  Adnexa: removed surgically  Uterus: absent   Rectal: not done   Extremity:   extremities normal, atraumatic, no cyanosis or edema     IMPRESSION:    Yamilet Hopson is doing well postoperatively. She has a diagnosis of stage IA, grade 1, endometrial carcinoma. Medical problems:     Patient Active Problem List   Diagnosis Code    Obesity, morbid ([facility] [address] E66.01    Endometrial hyperplasia N85.00    Endometrial cancer ([facility] [address] [facility] C54.1       PLAN:    The operative procedures and clinical results have been reviewed with the patient. Implications of diagnosis discussed at length. All questions answered. Based upon the favorable pathology, I do not recommend any adjuvant therapy and there is no need to go back to the OR for a lymph node evaluation. Her risk of recurrence is quite low. I discussed with her the rationale for and schedule of disease surveillance. I will see the patient back in three months to begin surveillance. The patient is advised to call our office with any problems or concerns.     Ann White MD  10/11/2018/2:28 PM

## 2018-10-31 ENCOUNTER — TELEPHONE (OUTPATIENT)
Dept: ONCOLOGY | Age: 58
End: 2018-10-31

## 2018-10-31 ENCOUNTER — DOCUMENTATION ONLY (OUTPATIENT)
Dept: ONCOLOGY | Age: 58
End: 2018-10-31

## 2018-10-31 NOTE — TELEPHONE ENCOUNTER
Called and left message for Ms. Cee Winchester via [employer]  # [phone]. In the message left, I described what is included in the survivorship care plan.  I also encouraged her to take it to her next appt with her GYN oncologist if she has any questions about it or if she would like to review the information in general. Or she is welcome to contact me and I will get a [employer]  on the phone again and we can review it together that way. Additionally, encouraged her to contact me if she or her family members need additional support, education or area resources, or have any follow-up questions regarding this message.  Left my contact phone #s for her use.

## 2018-10-31 NOTE — PROGRESS NOTES
Patient Name: Thiago Rodriguez   Patient : 1960      Health Care Providers (Including Names, Institution)   Primary Care Provider: None      Surgeon: Candace Adkins MD [place] [address] Oncology [phone]      Other Holland Soria MD [facility] [phone]     Treatment Summary   Diagnosis   Cancer Type/Location/Histology Subtype: Endometrial cancer/endometrioid carcinoma  FIGO (International Federation of Gynecology and Obstetrics) Grade 1      Stage:   1A     Treatment   Surgery: Yes Surgery Date(s) (year):      Surgical procedure/location/findings:  Total laparoscopic hysterectomy and bilateral salpingo-oophorectomy, myometrial invasion present, depth of invasion: 1 mm, percentage of myometrial invasion: 4%, other tissue/organ involvement: not identified, lymphovascular invasion: not identified; no lymph nodes removed     Radiation No   Body area treated:   Systemic Therapy (chemotherapy, hormonal therapy, other) No     Persistent symptoms or side effects at completion of treatment: No        Familial Cancer Risk Assessment   Genetic/hereditary risk factor(s) or predisposing conditions: none apparent     Genetic counseling: No                         Genetic testing: No     Follow-up Care Plan   Need for ongoing (adjuvant) treatment for cancer   No     Schedule of clinical visits   Coordinating Provider When/How often   Primary Care Provider For all non-cancer-related health care     GYN Oncologist Every 3 to 6 months for 2 years, every 6 to 12 months for years 3-5, annually after this--schedule for first 5 years depends on cancer stage (1-3)     OB-GYN As your cancer care team recommends       Cancer surveillance or other recommended related tests    Coordinating Provider What/When/How Often   Dr. Darryn Castañeda Every 3 to 6 months for 2 years, every 6 to 12 months for years 3-5, annually after this--frequency depends on stage of cancer and your doctor's recommendations for you     Please continue to see your primary care provider for all general health care recommended for a (man) (woman) your age, including cancer screening tests. Any symptoms should be brought to the attention of your provider:   1. Anything that represents a brand new symptom;  2. Anything that represents a persistent symptom;  3. Anything you are worried about that might be related to the cancer coming back. Possible late- and long-term effects that someone with this type of cancer and treatment may experience: Hot flashes, night sweats, vaginal dryness, varginal scarring (at top of vagina)     Cancer survivors may experience issues with the areas listed below. If you have any concerns in these or other areas, please speak with your doctors or nurses to find out how you can get help with them. Emotional and mental health        Fatigue                                          Weight changes             Stopping smoking                         Physical Functioning                      Insurance               School/Work                                 Financial advice or assistance          Memory or concentration loss      Parenting                                       Fertility                             Sexual functioning  Other      A number of lifestyle/behaviors can affect your ongoing health, including the risk for the cancer coming back or developing another cancer. Discuss these recommendations with your doctor or nurse: Tobacco use/cessation                    Diet                                         Alcohol use                           Sun screen use                               Weight management (loss/gain)                                           Physical activity     Resources you may be interested in: www,cancer. net     Other comments:      Prepared by:  DARLING Green, Cancer Survivorship Nurse  [facility] [phone] Delivered on: 10/31/18

## 2019-01-09 NOTE — PROGRESS NOTES
Three month check up. Patient reports no abnormal pain or bleeding. 1. Have you been to the ER, urgent care clinic since your last visit? Hospitalized since your last visit? No    2. Have you seen or consulted any other health care providers outside of the [address] [place] [address] since your last visit? Include any pap smears or colon screening.  No

## 2019-01-10 ENCOUNTER — OFFICE VISIT (OUTPATIENT)
Dept: GYNECOLOGY | Age: 59
End: 2019-01-10

## 2019-01-10 VITALS
HEIGHT: 63 IN | SYSTOLIC BLOOD PRESSURE: 117 MMHG | HEART RATE: 87 BPM | WEIGHT: 250 LBS | DIASTOLIC BLOOD PRESSURE: 69 MMHG | BODY MASS INDEX: 44.3 KG/M2

## 2019-01-10 DIAGNOSIS — C54.1 ENDOMETRIAL CANCER (HCC): Primary | ICD-10-CM

## 2019-01-10 NOTE — PROGRESS NOTES
[address] [place] [address] [address], [address] [place] [address]  P [phone]  F [phone]    Office Note  Patient ID:  Name:  Taina Villa  MRN:  [id number]  :  1960/58 y.o. Date:  1/10/2019      HISTORY OF PRESENT ILLNESS:  Taina Villa is a 59 y.o.  postmenopausal female with a diagnosis of stage IA, grade 1, endometrial carcinoma. In 2018 she underwent TLH, BSO for endometrial hyperplasia. Intraoperative pathology demonstrated no evidence of malignancy. Final pathology did demonstrated invasive carcinoma. Her pathology was still favorable and she was not recommended any additional therapy. She presents today for surveillance. She is doing well and is without complaints. She denies any vaginal bleeding or discharge, any pelvic or abdominal pain, or any changes in her bowel or bladder habits. ROS:   and GI review:  Negative  Cardiopulmonary review:  Negative   Musculoskeletal:  Negative    A comprehensive review of systems was negative except for that written in the History of Present Illness. , 10 point ROS      Problem List:  Patient Active Problem List    Diagnosis Date Noted    Endometrial cancer ([facility] [address] 10/11/2018    Endometrial hyperplasia 2018    Obesity, morbid ([facility] [address] 08/10/2018     PMH:  Past Medical History:   Diagnosis Date    Elevated BP       PSH:  Past Surgical History:   Procedure Laterality Date    HX  SECTION      X4    HX HEENT      HX TONSILLECTOMY        Social History:  Social History     Tobacco Use    Smoking status: Never Smoker    Smokeless tobacco: Never Used   Substance Use Topics    Alcohol use: No      Family History:  Family History   Problem Relation Age of Onset    Hypertension Mother     Cancer Brother         lymphoma    Cancer Maternal Uncle         unknown    Hypertension Maternal Grandmother     Hypertension Maternal Grandfather     Cancer Maternal Grandfather         stomach cancer    Cancer Other         breast cancer (cousin)    Anesth Problems Neg Hx       Medications: (reviewed)  Current Outpatient Medications   Medication Sig    traMADol (ULTRAM) 50 mg tablet Take 1 Tab by mouth every six (6) hours as needed for Pain. Max Daily Amount: 200 mg.  acetaminophen (TYLENOL) 325 mg tablet Take 650 mg by mouth every four (4) hours as needed for Pain.  ibuprofen 200 mg cap Take 4 Tabs by mouth as needed. No current facility-administered medications for this visit. Allergies: (reviewed)  No Known Allergies       OBJECTIVE:    Physical Exam:  VITAL SIGNS: Vitals:    01/10/19 1528   BP: 117/69   Pulse: 87   Weight: 250 lb (113.4 kg)   Height: 5' 2.99\" (1.6 m)     Body mass index is 44.3 kg/m². GENERAL JAK: Conversant, alert, oriented. No acute distress. HEENT: HEENT. No thyroid enlargement. No JVD. Neck: Supple without restrictions. RESPIRATORY: Clear to auscultation and percussion to the bases. No CVAT. CARDIOVASC: RRR without murmur/rub. GASTROINT: soft, non-tender, without masses or organomegaly   MUSCULOSKEL: no joint tenderness, deformity or swelling   EXTREMITIES: extremities normal, atraumatic, no cyanosis or edema   PELVIC: External genitalia: normal general appearance  Urinary system: urethral meatus normal  Vaginal: normal without tenderness, induration or masses  Cervix: absent  Adnexa: removed surgically  Uterus: absent   RECTAL: Deferred   ALVARO SURVEY: No suspicious lymphadenopathy or edema noted. NEURO: Grossly intact. No acute deficit.        Lab Data:    Lab Results   Component Value Date/Time    WBC 12.6 (H) 09/11/2018 05:14 AM    HGB 12.5 09/11/2018 05:14 AM    HCT 38.6 09/11/2018 05:14 AM    PLATELET 784 13/76/4475 05:14 AM    MCV 84.3 09/11/2018 05:14 AM     Lab Results   Component Value Date/Time    Sodium 140 09/11/2018 05:14 AM    Potassium 4.3 09/11/2018 05:14 AM    Chloride 106 09/11/2018 05:14 AM    CO2 25 09/11/2018 05:14 AM    Anion gap 9 09/11/2018 05:14 AM    Glucose 97 09/11/2018 05:14 AM    BUN 13 09/11/2018 05:14 AM    Creatinine 0.84 09/11/2018 05:14 AM    BUN/Creatinine ratio 15 09/11/2018 05:14 AM    GFR est AA >60 09/11/2018 05:14 AM    GFR est non-AA >60 09/11/2018 05:14 AM    Calcium 7.7 (L) 09/11/2018 05:14 AM           IMPRESSION/PLAN:  Vidhi Chang is a 59 y.o. female with a diagnosis of stage IA, grade 1, endometrial cancer. Sh has no evidence of disease on today's exam.  We will see her back in 3 months for continued surveillance of disease.         Signed By: Devendra Kee MD     1/10/2019/10:25 AM

## 2019-04-18 ENCOUNTER — OFFICE VISIT (OUTPATIENT)
Dept: GYNECOLOGY | Age: 59
End: 2019-04-18

## 2019-04-18 VITALS
HEIGHT: 63 IN | HEART RATE: 83 BPM | SYSTOLIC BLOOD PRESSURE: 136 MMHG | DIASTOLIC BLOOD PRESSURE: 81 MMHG | WEIGHT: 252 LBS | BODY MASS INDEX: 44.65 KG/M2

## 2019-04-18 DIAGNOSIS — C54.1 ENDOMETRIAL CANCER (HCC): Primary | ICD-10-CM

## 2019-04-18 NOTE — PATIENT INSTRUCTIONS
ZenDoc Activation    Thank you for requesting access to ZenDoc. Please follow the instructions below to securely access and download your online medical record. ZenDoc allows you to send messages to your doctor, view your test results, renew your prescriptions, schedule appointments, and more. How Do I Sign Up? 1. In your internet browser, go to [email or web address]. 2. Click on the First Time User? Click Here link in the Sign In box. You will see the New Member Sign Up page. 3. Enter your ZenDoc Access Code exactly as it appears below. You will not need to use this code after youve completed the sign-up process. If you do not sign up before the expiration date, you must request a new code. ZenDoc Access Code: [email or web address]  Expires: 2019  3:16 PM (This is the date your ZenDoc access code will )    4. Enter the last four digits of your Social Security Number (xxxx) and Date of Birth (mm/dd/yyyy) as indicated and click Submit. You will be taken to the next sign-up page. 5. Create a ZenDoc ID. This will be your ZenDoc login ID and cannot be changed, so think of one that is secure and easy to remember. 6. Create a ZenDoc password. You can change your password at any time. 7. Enter your Password Reset Question and Answer. This can be used at a later time if you forget your password. 8. Enter your e-mail address. You will receive e-mail notification when new information is available in [address] 9. Click Sign Up. You can now view and download portions of your medical record. 10. Click the Download Summary menu link to download a portable copy of your medical information. Additional Information    If you have questions, please visit the Frequently Asked Questions section of the ZenDoc website at [email or web address] Remember, ZenDoc is NOT to be used for urgent needs. For medical emergencies, dial 911.

## 2019-04-18 NOTE — PROGRESS NOTES
[facility] [address], [address] [facility]  P [phone]  F [phone]    Office Note  Patient ID:  Name:  Ninfa Max  MRN:  [id number]  :  1960/58 y.o. Date:  2019      HISTORY OF PRESENT ILLNESS:  Ninfa Max is a 59 y.o.  postmenopausal female with a diagnosis of stage IA, grade 1, endometrial carcinoma. In 2018 she underwent TLH, BSO for endometrial hyperplasia. Intraoperative pathology demonstrated no evidence of malignancy. Final pathology did demonstrated invasive carcinoma. Her pathology was still favorable and she was not recommended any additional therapy. She presents today for surveillance. She is doing well and is without complaints. She denies any vaginal bleeding or discharge, any pelvic or abdominal pain, or any changes in her bowel or bladder habits. ROS:   and GI review:  Negative  Cardiopulmonary review:  Negative   Musculoskeletal:  Negative    A comprehensive review of systems was negative except for that written in the History of Present Illness. , 10 point ROS      Problem List:  Patient Active Problem List    Diagnosis Date Noted    Endometrial cancer ([facility] [address] 10/11/2018    Endometrial hyperplasia 2018    Obesity, morbid ([facility] [address] 08/10/2018     PMH:  Past Medical History:   Diagnosis Date    Elevated BP       PSH:  Past Surgical History:   Procedure Laterality Date    HX  SECTION      X4    HX HEENT      HX TONSILLECTOMY        Social History:  Social History     Tobacco Use    Smoking status: Never Smoker    Smokeless tobacco: Never Used   Substance Use Topics    Alcohol use: No      Family History:  Family History   Problem Relation Age of Onset    Hypertension Mother     Cancer Brother         lymphoma    Cancer Maternal Uncle         unknown    Hypertension Maternal Grandmother     Hypertension Maternal Grandfather     Cancer Maternal Grandfather         stomach cancer    Cancer Other         breast cancer (cousin)    Anesth Problems Neg Hx       Medications: (reviewed)  Current Outpatient Medications   Medication Sig    ibuprofen 200 mg cap Take 4 Tabs by mouth as needed.  traMADol (ULTRAM) 50 mg tablet Take 1 Tab by mouth every six (6) hours as needed for Pain. Max Daily Amount: 200 mg.  acetaminophen (TYLENOL) 325 mg tablet Take 650 mg by mouth every four (4) hours as needed for Pain. No current facility-administered medications for this visit. Allergies: (reviewed)  No Known Allergies       OBJECTIVE:    Physical Exam:  VITAL SIGNS: Vitals:    04/18/19 1539   BP: 136/81   Pulse: 83   Weight: 252 lb (114.3 kg)   Height: 5' 2.99\" (1.6 m)     Body mass index is 44.65 kg/m². GENERAL JAK: Conversant, alert, oriented. No acute distress. HEENT: HEENT. No thyroid enlargement. No JVD. Neck: Supple without restrictions. RESPIRATORY: Clear to auscultation and percussion to the bases. No CVAT. CARDIOVASC: RRR without murmur/rub. GASTROINT: soft, non-tender, without masses or organomegaly   MUSCULOSKEL: no joint tenderness, deformity or swelling   EXTREMITIES: extremities normal, atraumatic, no cyanosis or edema   PELVIC: External genitalia: normal general appearance  Urinary system: urethral meatus normal  Vaginal: normal without tenderness, induration or masses  Cervix: absent  Adnexa: removed surgically  Uterus: absent   RECTAL: Deferred   ALVARO SURVEY: No suspicious lymphadenopathy or edema noted. NEURO: Grossly intact. No acute deficit.        Lab Data:    Lab Results   Component Value Date/Time    WBC 12.6 (H) 09/11/2018 05:14 AM    HGB 12.5 09/11/2018 05:14 AM    HCT 38.6 09/11/2018 05:14 AM    PLATELET 383 03/16/5273 05:14 AM    MCV 84.3 09/11/2018 05:14 AM     Lab Results   Component Value Date/Time    Sodium 140 09/11/2018 05:14 AM    Potassium 4.3 09/11/2018 05:14 AM    Chloride 106 09/11/2018 05:14 AM    CO2 25 09/11/2018 05:14 AM    Anion gap 9 09/11/2018 05:14 AM    Glucose 97 09/11/2018 05:14 AM    BUN 13 09/11/2018 05:14 AM    Creatinine 0.84 09/11/2018 05:14 AM    BUN/Creatinine ratio 15 09/11/2018 05:14 AM    GFR est AA >60 09/11/2018 05:14 AM    GFR est non-AA >60 09/11/2018 05:14 AM    Calcium 7.7 (L) 09/11/2018 05:14 AM           IMPRESSION/PLAN:  Alphonso Rock is a 59 y.o. female with a diagnosis of stage IA, grade 1, endometrial cancer. Ludwin has no evidence of disease on today's exam.  We will see her back in 3 months for continued surveillance of disease.         Signed By: Valentino Jay MD     4/18/2019/10:25 AM

## 2019-07-29 ENCOUNTER — OFFICE VISIT (OUTPATIENT)
Dept: URGENT CARE | Age: 59
End: 2019-07-29

## 2019-07-29 VITALS
OXYGEN SATURATION: 97 % | HEART RATE: 87 BPM | RESPIRATION RATE: 16 BRPM | SYSTOLIC BLOOD PRESSURE: 135 MMHG | WEIGHT: 258 LBS | TEMPERATURE: 99.3 F | HEIGHT: 63 IN | DIASTOLIC BLOOD PRESSURE: 64 MMHG | BODY MASS INDEX: 45.71 KG/M2

## 2019-07-29 DIAGNOSIS — M25.561 CHRONIC PAIN OF RIGHT KNEE: Primary | ICD-10-CM

## 2019-07-29 DIAGNOSIS — G89.29 CHRONIC PAIN OF RIGHT KNEE: Primary | ICD-10-CM

## 2019-07-29 RX ORDER — DICLOFENAC SODIUM 10 MG/G
GEL TOPICAL 4 TIMES DAILY
Qty: 1 EACH | Refills: 0 | Status: SHIPPED | OUTPATIENT
Start: 2019-07-29

## 2019-07-29 RX ORDER — KETOROLAC TROMETHAMINE 30 MG/ML
30 INJECTION, SOLUTION INTRAMUSCULAR; INTRAVENOUS ONCE
Status: COMPLETED | OUTPATIENT
Start: 2019-07-29 | End: 2019-07-29

## 2019-07-29 RX ORDER — NAPROXEN 500 MG/1
500 TABLET ORAL 2 TIMES DAILY WITH MEALS
Qty: 20 TAB | Refills: 0 | Status: SHIPPED | OUTPATIENT
Start: 2019-07-29 | End: 2019-08-08

## 2019-07-29 RX ADMIN — KETOROLAC TROMETHAMINE 30 MG: 30 INJECTION, SOLUTION INTRAMUSCULAR; INTRAVENOUS at 18:20

## 2019-07-29 NOTE — PROGRESS NOTES
Patient with progressive right knee pain. 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 She denies injury. She states history of left ankle injury which resulted in her relying on the right leg more. The history is provided by the patient. Knee Pain   This is a new problem. The current episode started more than 1 week ago. The problem has been gradually worsening. The symptoms are aggravated by walking, standing and bending. The symptoms are relieved by NSAIDs, rest and acetaminophen. She has tried acetaminophen, a cold compress and a warm compress (Topical muscle rub. ) for the symptoms.         Past Medical History:   Diagnosis Date    Elevated BP         Past Surgical History:   Procedure Laterality Date    HX  SECTION      X4    HX HEENT      HX TONSILLECTOMY           Family History   Problem Relation Age of Onset    Hypertension Mother     Cancer Brother         lymphoma    Cancer Maternal Uncle         unknown    Hypertension Maternal Grandmother     Hypertension Maternal Grandfather     Cancer Maternal Grandfather         stomach cancer    Cancer Other         breast cancer (cousin)    Anesth Problems Neg Hx         Social History     Socioeconomic History    Marital status:      Spouse name: Not on file    Number of children: Not on file    Years of education: Not on file    Highest education level: Not on file   Occupational History    Not on file   Social Needs    Financial resource strain: Not on file    Food insecurity:     Worry: Not on file     Inability: Not on file    Transportation needs:     Medical: Not on file     Non-medical: Not on file   Tobacco Use    Smoking status: Never Smoker    Smokeless tobacco: Never Used   Substance and Sexual Activity    Alcohol use: No    Drug use: No    Sexual activity: Yes   Lifestyle    Physical activity:     Days per week: Not on file     Minutes per session: Not on file    Stress: Not on file   Relationships    Social connections:     Talks on phone: Not on file     Gets together: Not on file     Attends [employer] service: Not on file     Active member of club or organization: Not on file     Attends meetings of clubs or organizations: Not on file     Relationship status: Not on file    Intimate partner violence:     Fear of current or ex partner: Not on file     Emotionally abused: Not on file     Physically abused: Not on file     Forced sexual activity: Not on file   Other Topics Concern    Not on file   Social History Narrative    Not on file                ALLERGIES: Patient has no known allergies. Review of Systems   Constitutional: Positive for activity change (Limited by pain. ). Negative for appetite change, chills and fever. Eyes: Negative for pain and redness. Genitourinary: Negative for dysuria. Musculoskeletal: Positive for arthralgias. Vitals:    07/29/19 1801   BP: 135/64   Pulse: 87   Resp: 16   Temp: 99.3 °F (37.4 °C)   SpO2: 97%   Weight: 258 lb (117 kg)   Height: 5' 3\" (1.6 m)       Physical Exam   Constitutional: Vital signs are normal. She appears well-developed and well-nourished. She is cooperative. She does not appear ill. No distress. Eyes: Conjunctivae are normal.   Musculoskeletal:        Right knee: She exhibits normal range of motion, no swelling, no effusion, no deformity and normal alignment. Tenderness found. Patellar tendon tenderness noted. Neurological: She is alert. Skin: She is not diaphoretic. [facility]    ICD-10-CM ICD-9-CM    1. Chronic pain of right knee M25.561 719.46 ketorolac tromethamine (TORADOL) 60 mg/2 mL injection 30 mg    G89.29 338.29 naproxen (NAPROSYN) 500 mg tablet      diclofenac (VOLTAREN) 1 % gel     Medications Ordered Today   Medications    ketorolac tromethamine (TORADOL) 60 mg/2 mL injection 30 mg    naproxen (NAPROSYN) 500 mg tablet     Sig: Take 1 Tab by mouth two (2) times daily (with meals) for 10 days.      Dispense:  20 Tab Refill:  0    diclofenac (VOLTAREN) 1 % gel     Sig: Apply  to affected area four (4) times daily. Dispense:  1 Each     Refill:  0     No results found for any visits on 07/29/19. The patients condition was discussed with the patient and they understand. The patient is to follow up with primary care doctor. If signs and symptoms become worse the pt is to go to the ER. The patient is to take medications as prescribed. This appears to be chronic rather than acute. No new injury. Educated patient on arthritis. Explained that we could X-ray but it would not change treatment plan. Problem is likely degenerative and will worsen with time until knee replacement necessary. Patient asks about weight and was advised that weight loss would help the pain as well as be required to qualify for knee replacement surgery if needed. Verbalized understanding.      Procedures

## 2019-07-29 NOTE — PATIENT INSTRUCTIONS
Knee Arthritis: Exercises  Introduction  Here are some examples of exercises for you to try. The exercises may be suggested for a condition or for rehabilitation. Start each exercise slowly. Ease off the exercises if you start to have pain. You will be told when to start these exercises and which ones will work best for you. How to do the exercises  Knee flexion with heel slide    1. Lie on your back with your knees bent. 2. Slide your heel back by bending your affected knee as far as you can. Then hook your other foot around your ankle to help pull your heel even farther back. 3. Hold for about 6 seconds, then rest for up to 10 seconds. 4. Repeat 8 to 12 times. 5. Switch legs and repeat steps 1 through 4, even if only one knee is sore. Quad sets    1. Sit with your affected leg straight and supported on the floor or a firm bed. Place a small, rolled-up towel under your knee. Your other leg should be bent, with that foot flat on the floor. 2. Tighten the thigh muscles of your affected leg by pressing the back of your knee down into the towel. 3. Hold for about 6 seconds, then rest for up to 10 seconds. 4. Repeat 8 to 12 times. 5. Switch legs and repeat steps 1 through 4, even if only one knee is sore. Straight-leg raises to the front    1. Lie on your back with your good knee bent so that your foot rests flat on the floor. Your affected leg should be straight. Make sure that your low back has a normal curve. You should be able to slip your hand in between the floor and the small of your back, with your palm touching the floor and your back touching the back of your hand. 2. Tighten the thigh muscles in your affected leg by pressing the back of your knee flat down to the floor. Hold your knee straight. 3. Keeping the thigh muscles tight and your leg straight, lift your affected leg up so that your heel is about 12 inches off the floor. Hold for about 6 seconds, then lower slowly.   4. Relax for up to 10 seconds between repetitions. 5. Repeat 8 to 12 times. 6. Switch legs and repeat steps 1 through 5, even if only one knee is sore. Active knee flexion    1. Lie on your stomach with your knees straight. If your kneecap is uncomfortable, roll up a washcloth and put it under your leg just above your kneecap. 2. Lift the foot of your affected leg by bending the knee so that you bring the foot up toward your buttock. If this motion hurts, try it without bending your knee quite as far. This may help you avoid any painful motion. 3. Slowly move your leg up and down. 4. Repeat 8 to 12 times. 5. Switch legs and repeat steps 1 through 4, even if only one knee is sore. Quadriceps stretch (facedown)    1. Lie flat on your stomach, and rest your face on the floor. 2. Wrap a towel or belt strap around the lower part of your affected leg. Then use the towel or belt strap to slowly pull your heel toward your buttock until you feel a stretch. 3. Hold for about 15 to 30 seconds, then relax your leg against the towel or belt strap. 4. Repeat 2 to 4 times. 5. Switch legs and repeat steps 1 through 4, even if only one knee is sore. Stationary exercise bike    1. If you do not have a stationary exercise bike at home, you can find one to ride at your local health club or community center. 2. Adjust the height of the bike seat so that your knee is slightly bent when your leg is extended downward. If your knee hurts when the pedal reaches the top, you can raise the seat so that your knee does not bend as much. 3. Start slowly. At first, try to do 5 to 10 minutes of cycling with little to no resistance. Then increase your time and the resistance bit by bit until you can do 20 to 30 minutes without pain. 4. If you start to have pain, rest your knee until your pain gets back to the level that is normal for you. Or cycle for less time or with less effort.     Follow-up care is a key part of your treatment and safety. Be sure to make and go to all appointments, and call your doctor if you are having problems. It's also a good idea to know your test results and keep a list of the medicines you take. Where can you learn more? Go to http://navi-dorian.info/. Enter C159 in the search box to learn more about \"Knee Arthritis: Exercises. \"  Current as of: September 20, 2018  Content Version: 12.1  © 1069-8188 [employer]. Care instructions adapted under license by [employer] (which disclaims liability or warranty for this information). If you have questions about a medical condition or this instruction, always ask your healthcare professional. [address] any warranty or liability for your use of this information.

## 2020-02-13 ENCOUNTER — APPOINTMENT (OUTPATIENT)
Dept: CT IMAGING | Age: 60
DRG: 871 | End: 2020-02-13
Attending: FAMILY MEDICINE
Payer: SELF-PAY

## 2020-02-13 ENCOUNTER — OFFICE VISIT (OUTPATIENT)
Dept: URGENT CARE | Age: 60
End: 2020-02-13

## 2020-02-13 ENCOUNTER — HOSPITAL ENCOUNTER (INPATIENT)
Age: 60
LOS: 3 days | Discharge: HOME | DRG: 871 | End: 2020-02-17
Attending: STUDENT IN AN ORGANIZED HEALTH CARE EDUCATION/TRAINING PROGRAM | Admitting: INTERNAL MEDICINE
Payer: SELF-PAY

## 2020-02-13 VITALS
RESPIRATION RATE: 18 BRPM | SYSTOLIC BLOOD PRESSURE: 171 MMHG | OXYGEN SATURATION: 92 % | DIASTOLIC BLOOD PRESSURE: 81 MMHG | TEMPERATURE: 100.5 F | WEIGHT: 253 LBS | HEART RATE: 86 BPM | HEIGHT: 63 IN | BODY MASS INDEX: 44.83 KG/M2

## 2020-02-13 DIAGNOSIS — R05.9 COUGH: ICD-10-CM

## 2020-02-13 DIAGNOSIS — J18.9 COMMUNITY ACQUIRED PNEUMONIA, UNSPECIFIED LATERALITY: ICD-10-CM

## 2020-02-13 DIAGNOSIS — R09.02 HYPOXIA: Primary | ICD-10-CM

## 2020-02-13 DIAGNOSIS — J18.9 PNEUMONIA OF BOTH LUNGS DUE TO INFECTIOUS ORGANISM, UNSPECIFIED PART OF LUNG: Primary | ICD-10-CM

## 2020-02-13 DIAGNOSIS — R50.9 FEVER, UNSPECIFIED FEVER CAUSE: ICD-10-CM

## 2020-02-13 DIAGNOSIS — R06.02 SHORTNESS OF BREATH: ICD-10-CM

## 2020-02-13 LAB
ALBUMIN SERPL-MCNC: 3.1 G/DL (ref 3.5–5)
ALBUMIN/GLOB SERPL: 0.6 {RATIO} (ref 1.1–2.2)
ALP SERPL-CCNC: 119 U/L (ref 45–117)
ALT SERPL-CCNC: 40 U/L (ref 12–78)
ANION GAP SERPL CALC-SCNC: 5 MMOL/L (ref 5–15)
APPEARANCE UR: ABNORMAL
ARTERIAL PATENCY WRIST A: YES
AST SERPL-CCNC: 47 U/L (ref 15–37)
ATRIAL RATE: 88 BPM
B PERT DNA SPEC QL NAA+PROBE: NOT DETECTED
BACTERIA URNS QL MICRO: NEGATIVE /HPF
BASE EXCESS BLD CALC-SCNC: 0 MMOL/L
BASOPHILS # BLD: 0 K/UL (ref 0–0.1)
BASOPHILS NFR BLD: 0 % (ref 0–1)
BDY SITE: ABNORMAL
BILIRUB SERPL-MCNC: 0.4 MG/DL (ref 0.2–1)
BILIRUB UR QL: NEGATIVE
BNP SERPL-MCNC: 72 PG/ML
BORDETELLA PARAPERTUSSIS PCR, BORPAR: NOT DETECTED
BUN SERPL-MCNC: 9 MG/DL (ref 6–20)
BUN/CREAT SERPL: 11 (ref 12–20)
C PNEUM DNA SPEC QL NAA+PROBE: NOT DETECTED
CA-I BLD-SCNC: 1.15 MMOL/L (ref 1.12–1.32)
CALCIUM SERPL-MCNC: 8.9 MG/DL (ref 8.5–10.1)
CALCULATED P AXIS, ECG09: 21 DEGREES
CALCULATED R AXIS, ECG10: 46 DEGREES
CALCULATED T AXIS, ECG11: 29 DEGREES
CHLORIDE SERPL-SCNC: 104 MMOL/L (ref 97–108)
CO2 SERPL-SCNC: 26 MMOL/L (ref 21–32)
COLOR UR: ABNORMAL
CREAT SERPL-MCNC: 0.8 MG/DL (ref 0.55–1.02)
DIAGNOSIS, 93000: NORMAL
DIFFERENTIAL METHOD BLD: ABNORMAL
EOSINOPHIL # BLD: 0 K/UL (ref 0–0.4)
EOSINOPHIL NFR BLD: 0 % (ref 0–7)
EPITH CASTS URNS QL MICRO: ABNORMAL /LPF
ERYTHROCYTE [DISTWIDTH] IN BLOOD BY AUTOMATED COUNT: 14.1 % (ref 11.5–14.5)
FLUAV H1 2009 PAND RNA SPEC QL NAA+PROBE: NOT DETECTED
FLUAV H1 RNA SPEC QL NAA+PROBE: NOT DETECTED
FLUAV H3 RNA SPEC QL NAA+PROBE: NOT DETECTED
FLUAV SUBTYP SPEC NAA+PROBE: NOT DETECTED
FLUAV+FLUBV AG NOSE QL IA.RAPID: NEGATIVE POS/NEG
FLUAV+FLUBV AG NOSE QL IA.RAPID: NEGATIVE POS/NEG
FLUBV RNA SPEC QL NAA+PROBE: NOT DETECTED
GAS FLOW.O2 O2 DELIVERY SYS: ABNORMAL L/MIN
GAS FLOW.O2 SETTING OXYMISER: 4 L/M
GLOBULIN SER CALC-MCNC: 4.9 G/DL (ref 2–4)
GLUCOSE SERPL-MCNC: 124 MG/DL (ref 65–100)
GLUCOSE UR STRIP.AUTO-MCNC: NEGATIVE MG/DL
HADV DNA SPEC QL NAA+PROBE: NOT DETECTED
HCO3 BLD-SCNC: 23.7 MMOL/L (ref 22–26)
HCOV 229E RNA SPEC QL NAA+PROBE: NOT DETECTED
HCOV HKU1 RNA SPEC QL NAA+PROBE: NOT DETECTED
HCOV NL63 RNA SPEC QL NAA+PROBE: NOT DETECTED
HCOV OC43 RNA SPEC QL NAA+PROBE: NOT DETECTED
HCT VFR BLD AUTO: 43.2 % (ref 35–47)
HGB BLD-MCNC: 14.3 G/DL (ref 11.5–16)
HGB UR QL STRIP: ABNORMAL
HMPV RNA SPEC QL NAA+PROBE: NOT DETECTED
HPIV1 RNA SPEC QL NAA+PROBE: NOT DETECTED
HPIV2 RNA SPEC QL NAA+PROBE: NOT DETECTED
HPIV3 RNA SPEC QL NAA+PROBE: NOT DETECTED
HPIV4 RNA SPEC QL NAA+PROBE: NOT DETECTED
HYALINE CASTS URNS QL MICRO: ABNORMAL /LPF (ref 0–5)
IMM GRANULOCYTES # BLD AUTO: 0.1 K/UL (ref 0–0.04)
IMM GRANULOCYTES NFR BLD AUTO: 1 % (ref 0–0.5)
KETONES UR QL STRIP.AUTO: NEGATIVE MG/DL
LACTATE SERPL-SCNC: 1.5 MMOL/L (ref 0.4–2)
LEUKOCYTE ESTERASE UR QL STRIP.AUTO: NEGATIVE
LYMPHOCYTES # BLD: 1.6 K/UL (ref 0.8–3.5)
LYMPHOCYTES NFR BLD: 23 % (ref 12–49)
M PNEUMO DNA SPEC QL NAA+PROBE: NOT DETECTED
MCH RBC QN AUTO: 26.5 PG (ref 26–34)
MCHC RBC AUTO-ENTMCNC: 33.1 G/DL (ref 30–36.5)
MCV RBC AUTO: 80 FL (ref 80–99)
MONOCYTES # BLD: 0.5 K/UL (ref 0–1)
MONOCYTES NFR BLD: 7 % (ref 5–13)
NEUTS SEG # BLD: 4.6 K/UL (ref 1.8–8)
NEUTS SEG NFR BLD: 69 % (ref 32–75)
NITRITE UR QL STRIP.AUTO: NEGATIVE
NRBC # BLD: 0 K/UL (ref 0–0.01)
NRBC BLD-RTO: 0 PER 100 WBC
P-R INTERVAL, ECG05: 166 MS
PCO2 BLD: 33.8 MMHG (ref 35–45)
PH BLD: 7.45 [PH] (ref 7.35–7.45)
PH UR STRIP: 6 [PH] (ref 5–8)
PLATELET # BLD AUTO: 275 K/UL (ref 150–400)
PMV BLD AUTO: 9.2 FL (ref 8.9–12.9)
PO2 BLD: 58 MMHG (ref 80–100)
POTASSIUM SERPL-SCNC: 3.8 MMOL/L (ref 3.5–5.1)
PROT SERPL-MCNC: 8 G/DL (ref 6.4–8.2)
PROT UR STRIP-MCNC: NEGATIVE MG/DL
Q-T INTERVAL, ECG07: 372 MS
QRS DURATION, ECG06: 84 MS
QTC CALCULATION (BEZET), ECG08: 450 MS
RBC # BLD AUTO: 5.4 M/UL (ref 3.8–5.2)
RBC #/AREA URNS HPF: ABNORMAL /HPF (ref 0–5)
RSV RNA SPEC QL NAA+PROBE: NOT DETECTED
RV+EV RNA SPEC QL NAA+PROBE: NOT DETECTED
SAO2 % BLD: 91 % (ref 92–97)
SODIUM SERPL-SCNC: 135 MMOL/L (ref 136–145)
SP GR UR REFRACTOMETRY: 1.01 (ref 1–1.03)
SPECIMEN TYPE: ABNORMAL
TROPONIN I SERPL-MCNC: <0.05 NG/ML
TROPONIN I SERPL-MCNC: <0.05 NG/ML
UA: UC IF INDICATED,UAUC: ABNORMAL
UR CULT HOLD, URHOLD: NORMAL
UROBILINOGEN UR QL STRIP.AUTO: 1 EU/DL (ref 0.2–1)
VALID INTERNAL CONTROL?: YES
VENTRICULAR RATE, ECG03: 88 BPM
WBC # BLD AUTO: 6.7 K/UL (ref 3.6–11)
WBC URNS QL MICRO: ABNORMAL /HPF (ref 0–4)

## 2020-02-13 PROCEDURE — 36415 COLL VENOUS BLD VENIPUNCTURE: CPT

## 2020-02-13 PROCEDURE — 77010033678 HC OXYGEN DAILY

## 2020-02-13 PROCEDURE — 99284 EMERGENCY DEPT VISIT MOD MDM: CPT

## 2020-02-13 PROCEDURE — 99282 EMERGENCY DEPT VISIT SF MDM: CPT

## 2020-02-13 PROCEDURE — 99218 HC RM OBSERVATION: CPT

## 2020-02-13 PROCEDURE — 87899 AGENT NOS ASSAY W/OPTIC: CPT

## 2020-02-13 PROCEDURE — 94664 DEMO&/EVAL PT USE INHALER: CPT

## 2020-02-13 PROCEDURE — 82803 BLOOD GASES ANY COMBINATION: CPT

## 2020-02-13 PROCEDURE — 74011000258 HC RX REV CODE- 258: Performed by: STUDENT IN AN ORGANIZED HEALTH CARE EDUCATION/TRAINING PROGRAM

## 2020-02-13 PROCEDURE — 83605 ASSAY OF LACTIC ACID: CPT

## 2020-02-13 PROCEDURE — 74011250636 HC RX REV CODE- 250/636: Performed by: FAMILY MEDICINE

## 2020-02-13 PROCEDURE — 74011636320 HC RX REV CODE- 636/320: Performed by: RADIOLOGY

## 2020-02-13 PROCEDURE — 85025 COMPLETE CBC W/AUTO DIFF WBC: CPT

## 2020-02-13 PROCEDURE — 87040 BLOOD CULTURE FOR BACTERIA: CPT

## 2020-02-13 PROCEDURE — 74011000250 HC RX REV CODE- 250: Performed by: FAMILY MEDICINE

## 2020-02-13 PROCEDURE — 0100U RESPIRATORY PANEL,PCR,NASOPHARYNGEAL: CPT

## 2020-02-13 PROCEDURE — 94640 AIRWAY INHALATION TREATMENT: CPT

## 2020-02-13 PROCEDURE — 81001 URINALYSIS AUTO W/SCOPE: CPT

## 2020-02-13 PROCEDURE — 96365 THER/PROPH/DIAG IV INF INIT: CPT

## 2020-02-13 PROCEDURE — 96372 THER/PROPH/DIAG INJ SC/IM: CPT

## 2020-02-13 PROCEDURE — 74011000258 HC RX REV CODE- 258: Performed by: RADIOLOGY

## 2020-02-13 PROCEDURE — 93005 ELECTROCARDIOGRAM TRACING: CPT

## 2020-02-13 PROCEDURE — 36600 WITHDRAWAL OF ARTERIAL BLOOD: CPT

## 2020-02-13 PROCEDURE — 80053 COMPREHEN METABOLIC PANEL: CPT

## 2020-02-13 PROCEDURE — 87449 NOS EACH ORGANISM AG IA: CPT

## 2020-02-13 PROCEDURE — 77030029684 HC NEB SM VOL KT MONA -A

## 2020-02-13 PROCEDURE — 96375 TX/PRO/DX INJ NEW DRUG ADDON: CPT

## 2020-02-13 PROCEDURE — 83880 ASSAY OF NATRIURETIC PEPTIDE: CPT

## 2020-02-13 PROCEDURE — 74011250636 HC RX REV CODE- 250/636: Performed by: STUDENT IN AN ORGANIZED HEALTH CARE EDUCATION/TRAINING PROGRAM

## 2020-02-13 PROCEDURE — 84484 ASSAY OF TROPONIN QUANT: CPT

## 2020-02-13 PROCEDURE — 71275 CT ANGIOGRAPHY CHEST: CPT

## 2020-02-13 RX ORDER — HEPARIN SODIUM 5000 [USP'U]/ML
5000 INJECTION, SOLUTION INTRAVENOUS; SUBCUTANEOUS EVERY 8 HOURS
Status: DISCONTINUED | OUTPATIENT
Start: 2020-02-13 | End: 2020-02-14

## 2020-02-13 RX ORDER — ACETAMINOPHEN 325 MG/1
650 TABLET ORAL
Status: DISCONTINUED | OUTPATIENT
Start: 2020-02-13 | End: 2020-02-17 | Stop reason: HOSPADM

## 2020-02-13 RX ORDER — SODIUM CHLORIDE 0.9 % (FLUSH) 0.9 %
5-40 SYRINGE (ML) INJECTION EVERY 8 HOURS
Status: DISCONTINUED | OUTPATIENT
Start: 2020-02-13 | End: 2020-02-17 | Stop reason: HOSPADM

## 2020-02-13 RX ORDER — SODIUM CHLORIDE 0.9 % (FLUSH) 0.9 %
5-10 SYRINGE (ML) INJECTION AS NEEDED
Status: DISCONTINUED | OUTPATIENT
Start: 2020-02-13 | End: 2020-02-17 | Stop reason: HOSPADM

## 2020-02-13 RX ORDER — IPRATROPIUM BROMIDE AND ALBUTEROL SULFATE 2.5; .5 MG/3ML; MG/3ML
3 SOLUTION RESPIRATORY (INHALATION)
Status: DISCONTINUED | OUTPATIENT
Start: 2020-02-13 | End: 2020-02-15

## 2020-02-13 RX ORDER — SODIUM CHLORIDE 9 MG/ML
75 INJECTION, SOLUTION INTRAVENOUS CONTINUOUS
Status: DISCONTINUED | OUTPATIENT
Start: 2020-02-13 | End: 2020-02-15

## 2020-02-13 RX ORDER — SODIUM CHLORIDE 0.9 % (FLUSH) 0.9 %
5-40 SYRINGE (ML) INJECTION AS NEEDED
Status: DISCONTINUED | OUTPATIENT
Start: 2020-02-13 | End: 2020-02-17 | Stop reason: HOSPADM

## 2020-02-13 RX ORDER — BENZONATATE 100 MG/1
100 CAPSULE ORAL
Status: DISCONTINUED | OUTPATIENT
Start: 2020-02-13 | End: 2020-02-17 | Stop reason: HOSPADM

## 2020-02-13 RX ORDER — SODIUM CHLORIDE 0.9 % (FLUSH) 0.9 %
10 SYRINGE (ML) INJECTION
Status: COMPLETED | OUTPATIENT
Start: 2020-02-13 | End: 2020-02-13

## 2020-02-13 RX ADMIN — IOPAMIDOL 80 ML: 755 INJECTION, SOLUTION INTRAVENOUS at 17:43

## 2020-02-13 RX ADMIN — Medication 10 ML: at 22:22

## 2020-02-13 RX ADMIN — SODIUM CHLORIDE 75 ML/HR: 900 INJECTION, SOLUTION INTRAVENOUS at 15:09

## 2020-02-13 RX ADMIN — IPRATROPIUM BROMIDE AND ALBUTEROL SULFATE 3 ML: .5; 3 SOLUTION RESPIRATORY (INHALATION) at 23:51

## 2020-02-13 RX ADMIN — HEPARIN SODIUM 5000 UNITS: 5000 INJECTION INTRAVENOUS; SUBCUTANEOUS at 17:30

## 2020-02-13 RX ADMIN — SODIUM CHLORIDE 100 ML: 900 INJECTION, SOLUTION INTRAVENOUS at 17:43

## 2020-02-13 RX ADMIN — AZITHROMYCIN MONOHYDRATE 500 MG: 500 INJECTION, POWDER, LYOPHILIZED, FOR SOLUTION INTRAVENOUS at 14:22

## 2020-02-13 RX ADMIN — Medication 10 ML: at 17:43

## 2020-02-13 RX ADMIN — HEPARIN SODIUM 5000 UNITS: 5000 INJECTION INTRAVENOUS; SUBCUTANEOUS at 23:43

## 2020-02-13 RX ADMIN — SODIUM CHLORIDE 75 ML/HR: 900 INJECTION, SOLUTION INTRAVENOUS at 16:52

## 2020-02-13 RX ADMIN — IPRATROPIUM BROMIDE AND ALBUTEROL SULFATE 3 ML: .5; 3 SOLUTION RESPIRATORY (INHALATION) at 20:10

## 2020-02-13 RX ADMIN — Medication 10 ML: at 15:09

## 2020-02-13 RX ADMIN — CEFTRIAXONE 2 G: 2 INJECTION, POWDER, FOR SOLUTION INTRAMUSCULAR; INTRAVENOUS at 13:28

## 2020-02-13 NOTE — ROUTINE PROCESS
TRANSFER - OUT REPORT: 
 
Verbal report given to JUAN Man(name) on Lilian  being transferred to [facility]) for routine progression of care Report consisted of patients Situation, Background, Assessment and  
Recommendations(SBAR). Information from the following report(s) SBAR, Kardex, Intake/Output, MAR and Recent Results was reviewed with the receiving nurse. Lines:  
Peripheral IV Left Antecubital (Active) Site Assessment Clean, dry, & intact 2/13/2020 11:29 AM  
Phlebitis Assessment 0 2/13/2020 11:29 AM  
Infiltration Assessment 0 2/13/2020 11:29 AM  
Dressing Status Clean, dry, & intact 2/13/2020 11:29 AM  
Dressing Type Tape;Transparent 2/13/2020 11:29 AM  
Hub Color/Line Status Pink;Flushed;Patent 2/13/2020 11:29 AM  
Action Taken Blood drawn 2/13/2020 11:29 AM  
Alcohol Cap Used No 2/13/2020 11:29 AM  
   
Peripheral IV 02/13/20 Right Hand (Active) Site Assessment Clean, dry, & intact 2/13/2020  2:52 PM  
Phlebitis Assessment 0 2/13/2020  2:52 PM  
Infiltration Assessment 0 2/13/2020  2:52 PM  
Dressing Status Clean, dry, & intact 2/13/2020  2:52 PM  
Hub Color/Line Status Pink;Capped;Flushed;Patent 2/13/2020  2:52 PM  
Action Taken Blood drawn 2/13/2020  2:52 PM  
  
 
Opportunity for questions and clarification was provided. Patient transported with: 
 Monitor O2 @ 4 liters

## 2020-02-13 NOTE — PROGRESS NOTES
Cough   The history is provided by the patient. This is a new problem. Episode onset: 1 week ago. The problem occurs constantly. The problem has been gradually worsening. The cough is productive of sputum. Patient reports a subjective fever - was not measured. Associated symptoms include chills, sweats, rhinorrhea, myalgias and shortness of breath. Pertinent negatives include no chest pain, no ear congestion, no ear pain, no headaches, no sore throat, no wheezing, no nausea and no vomiting. She is not a smoker.         Past Medical History:   Diagnosis Date    Elevated BP         Past Surgical History:   Procedure Laterality Date    HX  SECTION      X4    HX HEENT      HX TONSILLECTOMY           Family History   Problem Relation Age of Onset    Hypertension Mother     Cancer Brother         lymphoma    Cancer Maternal Uncle         unknown    Hypertension Maternal Grandmother     Hypertension Maternal Grandfather     Cancer Maternal Grandfather         stomach cancer    Cancer Other         breast cancer (cousin)    Anesth Problems Neg Hx         Social History     Socioeconomic History    Marital status:      Spouse name: Not on file    Number of children: Not on file    Years of education: Not on file    Highest education level: Not on file   Occupational History    Not on file   Social Needs    Financial resource strain: Not on file    Food insecurity:     Worry: Not on file     Inability: Not on file    Transportation needs:     Medical: Not on file     Non-medical: Not on file   Tobacco Use    Smoking status: Never Smoker    Smokeless tobacco: Never Used   Substance and Sexual Activity    Alcohol use: No    Drug use: No    Sexual activity: Yes   Lifestyle    Physical activity:     Days per week: Not on file     Minutes per session: Not on file    Stress: Not on file   Relationships    Social connections:     Talks on phone: Not on file     Gets together: Not on file Attends Orthodoxy service: Not on file     Active member of club or organization: Not on file     Attends meetings of clubs or organizations: Not on file     Relationship status: Not on file    Intimate partner violence:     Fear of current or ex partner: Not on file     Emotionally abused: Not on file     Physically abused: Not on file     Forced sexual activity: Not on file   Other Topics Concern    Not on file   Social History Narrative    Not on file                ALLERGIES: Patient has no known allergies. Review of Systems   Constitutional: Positive for activity change, appetite change, chills and fatigue. Negative for fever. HENT: Positive for congestion and rhinorrhea. Negative for ear pain, sinus pressure, sinus pain, sore throat and trouble swallowing. Respiratory: Positive for cough, chest tightness and shortness of breath. Negative for wheezing. Cardiovascular: Negative for chest pain and palpitations. Gastrointestinal: Negative for nausea and vomiting. Musculoskeletal: Positive for myalgias. Neurological: Negative for dizziness and headaches. Hematological: Negative for adenopathy. Vitals:    02/13/20 0924   BP: 171/81   Pulse: 86   Resp: 18   Temp: (!) 100.5 °F (38.1 °C)   SpO2: 92%   Weight: 253 lb (114.8 kg)   Height: 5' 3\" (1.6 m)       Physical Exam  Vitals signs and nursing note reviewed. Constitutional:       General: She is not in acute distress. Appearance: She is well-developed. She is ill-appearing. She is not diaphoretic. HENT:      Right Ear: Ear canal and external ear normal. Tympanic membrane is erythematous. Left Ear: Ear canal and external ear normal. Tympanic membrane is erythematous. Nose: Nose normal.      Right Sinus: No maxillary sinus tenderness or frontal sinus tenderness. Left Sinus: No maxillary sinus tenderness or frontal sinus tenderness.       Mouth/Throat:      Pharynx: No oropharyngeal exudate or posterior oropharyngeal erythema. Tonsils: No tonsillar abscesses. Cardiovascular:      Rate and Rhythm: Normal rate and regular rhythm. Heart sounds: Normal heart sounds. Pulmonary:      Effort: Pulmonary effort is normal. No respiratory distress. Breath sounds: Examination of the right-middle field reveals decreased breath sounds. Examination of the left-middle field reveals decreased breath sounds. Examination of the right-lower field reveals decreased breath sounds. Examination of the left-lower field reveals decreased breath sounds. Decreased breath sounds present. No wheezing or rales. Lymphadenopathy:      Cervical: No cervical adenopathy. Neurological:      Mental Status: She is alert. Psychiatric:         Behavior: Behavior normal.         Thought Content: Thought content normal.         Judgment: Judgment normal.         MDM    ICD-10-CM ICD-9-CM   1. Pneumonia of both lungs due to infectious organism, unspecified part of lung J18.9 483.8   2. Shortness of breath R06.02 786.05   3. Fever, unspecified fever cause R50.9 780.60   4. Cough R05 786.2       Orders Placed This Encounter    XR CHEST PA LAT     Standing Status:   Future     Number of Occurrences:   1     Standing Expiration Date:   3/13/2021    AMB POC JAHAIRA INFLUENZA A/B TEST      Hypoxic in setting of Bilateral PNA  Referred to ER via EMS    Results for orders placed or performed in visit on 02/13/20   AMB POC JAHAIRA INFLUENZA A/B TEST   Result Value Ref Range    VALID INTERNAL CONTROL POC Yes     Influenza A Ag POC Negative Negative Pos/Neg    Influenza B Ag POC Negative Negative Pos/Neg     XR Results (most recent):  Results from Appointment encounter on 02/13/20   XR CHEST PA LAT    Narrative EXAM: XR CHEST PA LAT    INDICATION: Cough and hypertension. No laboratory values at the time of this  interpretation. COMPARISON: Chest views on 8/30/2018.     TECHNIQUE: PA and lateral chest views    FINDINGS: The cardiomediastinal and hilar contours are within normal limits. The  pulmonary vasculature is within normal limits. Moderate patchy bilateral airspace opacities are greatest in the right lower  lobe. Pleural spaces are clear. The visualized bones and upper abdomen are  age-appropriate. Impression IMPRESSION:    Bilateral pneumonia more likely than pulmonary edema. Recommend followup PA and  lateral chest views in 8-10 weeks to ensure resolution.          Procedures

## 2020-02-13 NOTE — PROGRESS NOTES
Primary Nurse Robel Vazquez RN and Marissa Issa RN performed a dual skin assessment on this patient No impairment noted

## 2020-02-13 NOTE — ED TRIAGE NOTES
Patient presents from doctors office via EMS with complaints of cough, fever, chills and body aches. Patient reports this has been going on for a while and she was feeling badly before she traveled to [facility].  Patient is hypoxic on room air upon arrival. RN placed patient on 3 L NC

## 2020-02-13 NOTE — H&P
[facility]  HISTORY AND PHYSICAL    Name:  Elizabeth Waldrop  MR#:  [id number]  :  1960  ACCOUNT #:  [de-identified]  ADMIT DATE:  2020      CHIEF COMPLAINT:  Shortness of breath. HISTORY OF PRESENT ILLNESS:  The patient is a 25-year-old female with past medical history of hypertension, who presents to the hospital with the above-mentioned symptom. History was primarily obtained from the patient. The patient reports that she recently traveled to [facility], came back and started having some shortness of breath about a week back. The patient reports she was coughing \"a storm\" and reports that she has continued to feel ill. The family reports that she had chills, cough, and shortness of breath. Symptoms continued to get worse. She was seen in the urgent care today and was told that she has \"double pneumonia\" and she should go to the ER for further management and evaluation. The patient came to the ER and was found to be hypoxic, somewhat tachypneic, and was requested to be admitted under the hospitalist service. The patient had a fever of 101 degrees Fahrenheit and was requested to be admitted under the hospitalist service. The patient reports she continues to be short of breath and has some chest pain associated with her symptoms. Denies any headache, blurry vision, sore throat, trouble swallowing, trouble with speech. Denies any sick contacts, cough, abdominal pain, constipation, diarrhea, urinary symptoms, focal or generalized neurological weakness. Does admit to recent travel,  sick contacts, falls, injuries, hematemesis, melena, hemoptysis, hematuria, or any other concerns or problems. PAST MEDICAL HISTORY:  See above. HOME MEDICATIONS:  Acetaminophen and ibuprofen. SOCIAL HISTORY:  Denies tobacco abuse. No alcohol use or IV drug abuse. FAMILY HISTORY:  Mother has a history of hypertension. Brother has a history of lymphoma.   Maternal uncle has a history of cancer. Maternal grandmother has a history of hypertension. Maternal grandfather has a history of hypertension. REVIEW OF SYMPTOMS:  All systems were reviewed and found to be essentially negative except for the symptoms mentioned above. ALLERGIES:  NO KNOWN DRUG ALLERGIES. PHYSICAL EXAMINATION:  VITAL SIGNS:  Temperature 99.3, pulse 84, respiratory rate 21, blood pressure 109/66, pulse oximetry 93% on 4 liters. GENERAL:  Alert x3, awake, moderately distressed, pleasant female, appears to be stated age. HEENT:  Pupils equal and reactive to light. Dry mucous membranes. Tympanic membranes clear. NECK:  Supple. CHEST:  Decreased basilar breath sounds, scattered rhonchi. CORONARY:  S1 and S2 heard. ABDOMEN:  Soft, nontender, nondistended. Bowel sounds are physiological.  EXTREMITIES:  No clubbing, no cyanosis, no edema. NEURO/PSYCH:  Pleasant mood and affect. Cranial nerves II through XII are grossly intact. Sensory grossly within normal limits. DTRs 2+ x4. Strength 5/5. SKIN:  Warm. LABORATORY DATA:  White count 6.7, hemoglobin 14.2, hematocrit 43.2, platelets 634. Urine shows no signs of infection. Sodium 135, potassium 3.8, chloride 104, bicarbonate 26, anion gap 5, glucose 124, BUN 9, creatinine 0.80, calcium 8.9. ALT 40, AST 37, alkaline phosphatase 119. Lactic acid 1.5. Troponin is less than 0.05. Blood cultures are pending. X-ray of the chest shows bilateral pneumonia more likely than pulmonary edema, recommend followup. ASSESSMENT AND PLAN:  1. Acute hypoxic respiratory failure. The patient will be admitted on a telemetry bed. We will get a CT of the chest, start the patient on broad-spectrum IV antibiotics, respiratory panel, DuoNeb, oxygen support, pulse oximetry monitoring, get an arterial blood gas, and further intervention per hospital course. Reassess as needed and continue to closely monitor.   We will get Legionella and streptococcus antigen and further intervention per hospital course. Continue to monitor for now. 2.  Hypertension, currently controlled. Continue to monitor. Further intervention per hospital course. 3.  Gastrointestinal and deep venous thrombosis prophylaxis. The patient will be on heparin.       Philip Michelle MD [id number]  D:  02/13/2020 16:01  T:  02/13/2020 17:53  JOB #:  [id number]

## 2020-02-13 NOTE — ED PROVIDER NOTES
Patient is a 33-year-old female who reports a past medical history of hypertension and presents to the emergency department by EMS from an urgent care center with a chief complaint of \"I was diagnosed with double pneumonia. \"  EMS reports that patient returned from [facility] yesterday after a 5-day stay there. She began feeling ill 3 to 4 days ago with chills, cough, fever, shortness of breath. Symptoms progressed after returning home yesterday and today she went for evaluation. She had a chest x-ray which showed bilateral pneumonia and a negative flu test and she was referred here for further evaluation and management due to low oxygen saturations on room air, reported by EMS to be 88 to 89%. Currently on 3 L by nasal cannula. EMS also reports a temperature of 101 °F.  Patient denies any sick contacts. Nothing makes her symptoms worse, nothing makes it better.            Past Medical History:   Diagnosis Date    Elevated BP        Past Surgical History:   Procedure Laterality Date    HX  SECTION      X4    HX HEENT      HX TONSILLECTOMY           Family History:   Problem Relation Age of Onset    Hypertension Mother     Cancer Brother         lymphoma    Cancer Maternal Uncle         unknown    Hypertension Maternal Grandmother     Hypertension Maternal Grandfather     Cancer Maternal Grandfather         stomach cancer    Cancer Other         breast cancer (cousin)    Anesth Problems Neg Hx        Social History     Socioeconomic History    Marital status:      Spouse name: Not on file    Number of children: Not on file    Years of education: Not on file    Highest education level: Not on file   Occupational History    Not on file   Social Needs    Financial resource strain: Not on file    Food insecurity:     Worry: Not on file     Inability: Not on file    Transportation needs:     Medical: Not on file     Non-medical: Not on file   Tobacco Use    Smoking status: Never Smoker  Smokeless tobacco: Never Used   Substance and Sexual Activity    Alcohol use: No    Drug use: No    Sexual activity: Yes   Lifestyle    Physical activity:     Days per week: Not on file     Minutes per session: Not on file    Stress: Not on file   Relationships    Social connections:     Talks on phone: Not on file     Gets together: Not on file     Attends [employer] service: Not on file     Active member of club or organization: Not on file     Attends meetings of clubs or organizations: Not on file     Relationship status: Not on file    Intimate partner violence:     Fear of current or ex partner: Not on file     Emotionally abused: Not on file     Physically abused: Not on file     Forced sexual activity: Not on file   Other Topics Concern    Not on file   Social History Narrative    Not on file         ALLERGIES: Patient has no known allergies. Review of Systems   Constitutional: Positive for activity change, fatigue and fever. Respiratory: Positive for cough and shortness of breath. Cardiovascular: Negative for chest pain. Gastrointestinal: Negative for abdominal pain and vomiting. Genitourinary: Negative for dysuria. Musculoskeletal: Positive for myalgias. Negative for back pain. Skin: Negative for rash. Neurological: Negative for syncope and headaches. All other systems reviewed and are negative. There were no vitals filed for this visit. Physical Exam  Vitals signs and nursing note reviewed. Constitutional:       General: She is not in acute distress. Appearance: She is well-developed. She is ill-appearing. HENT:      Head: Normocephalic and atraumatic. Nose: Nose normal.      Mouth/Throat:      Mouth: Mucous membranes are moist.      Pharynx: Oropharynx is clear. Eyes:      General:         Right eye: No discharge. Left eye: No discharge.       Conjunctiva/sclera: Conjunctivae normal.   Neck:      Musculoskeletal: Normal range of motion and neck supple. Cardiovascular:      Rate and Rhythm: Normal rate and regular rhythm. Heart sounds: Normal heart sounds. Pulmonary:      Comments: +tachypnea    Abdominal:      Palpations: Abdomen is soft. Tenderness: There is no abdominal tenderness. There is no guarding. Musculoskeletal: Normal range of motion. General: No tenderness. Right lower leg: No edema. Left lower leg: No edema. Skin:     General: Skin is warm and dry. Neurological:      Mental Status: She is alert and oriented to person, place, and time. Motor: No abnormal muscle tone. [facility]       Procedures    Assessment and plan: This is a 73-year-old female who was sent from urgent care center with hypoxia and bilateral pneumonia. She will require admission due to her hypoxic respiratory failure. Will start Rocephin and Zithromax for treatment of pneumonia. Discussed plan for admission with patient and , they agree with and understand the plan. All questions answered. Hospitalist Perfect Serve for Admission  10:56 AM      EKG interpretation: 11:05  Rhythm: normal sinus rhythm; and regular . Rate (approx.): 88; Axis: normal; Intervals: normal ; ST/T wave: no STEMI; EKG documented and interpreted by Phil Decker MD, ED MD.    12:56 PM  ED Room Number: [facility]  Patient Name and age:  Clevester Fire 62 y.o.  female  Working Diagnosis:   1. Hypoxia    2. Community acquired pneumonia, unspecified laterality      Readmission: no  Isolation Requirements:  no  Recommended Level of Care:  telemetry  Code Status:  Full Code  Department:[facility] Adult ED - 21   Other:  Sent from [address], 88% on RA, b/l PNA on CXR. Labs pending. On 3L by NC and stable.

## 2020-02-14 PROBLEM — J18.9 PNEUMONIA: Status: ACTIVE | Noted: 2020-02-14

## 2020-02-14 LAB
ALBUMIN SERPL-MCNC: 2.8 G/DL (ref 3.5–5)
ALBUMIN/GLOB SERPL: 0.6 {RATIO} (ref 1.1–2.2)
ALP SERPL-CCNC: 106 U/L (ref 45–117)
ALT SERPL-CCNC: 41 U/L (ref 12–78)
ANION GAP SERPL CALC-SCNC: 8 MMOL/L (ref 5–15)
AST SERPL-CCNC: 46 U/L (ref 15–37)
BASOPHILS # BLD: 0 K/UL (ref 0–0.1)
BASOPHILS NFR BLD: 0 % (ref 0–1)
BILIRUB SERPL-MCNC: 0.3 MG/DL (ref 0.2–1)
BUN SERPL-MCNC: 10 MG/DL (ref 6–20)
BUN/CREAT SERPL: 14 (ref 12–20)
CALCIUM SERPL-MCNC: 8.8 MG/DL (ref 8.5–10.1)
CHLORIDE SERPL-SCNC: 105 MMOL/L (ref 97–108)
CO2 SERPL-SCNC: 25 MMOL/L (ref 21–32)
CREAT SERPL-MCNC: 0.71 MG/DL (ref 0.55–1.02)
DIFFERENTIAL METHOD BLD: NORMAL
EOSINOPHIL # BLD: 0.1 K/UL (ref 0–0.4)
EOSINOPHIL NFR BLD: 1 % (ref 0–7)
ERYTHROCYTE [DISTWIDTH] IN BLOOD BY AUTOMATED COUNT: 14.1 % (ref 11.5–14.5)
FLUID CULTURE, SPNG2: NORMAL
GLOBULIN SER CALC-MCNC: 4.5 G/DL (ref 2–4)
GLUCOSE SERPL-MCNC: 111 MG/DL (ref 65–100)
HCT VFR BLD AUTO: 40.5 % (ref 35–47)
HGB BLD-MCNC: 13 G/DL (ref 11.5–16)
IMM GRANULOCYTES # BLD AUTO: 0 K/UL
IMM GRANULOCYTES NFR BLD AUTO: 0 %
L PNEUMO1 AG UR QL IA: NEGATIVE
LYMPHOCYTES # BLD: 1.3 K/UL (ref 0.8–3.5)
LYMPHOCYTES NFR BLD: 21 % (ref 12–49)
MCH RBC QN AUTO: 26.2 PG (ref 26–34)
MCHC RBC AUTO-ENTMCNC: 32.1 G/DL (ref 30–36.5)
MCV RBC AUTO: 81.5 FL (ref 80–99)
MONOCYTES # BLD: 0.8 K/UL (ref 0–1)
MONOCYTES NFR BLD: 12 % (ref 5–13)
NEUTS SEG # BLD: 4.1 K/UL (ref 1.8–8)
NEUTS SEG NFR BLD: 66 % (ref 32–75)
NRBC # BLD: 0 K/UL (ref 0–0.01)
NRBC BLD-RTO: 0 PER 100 WBC
ORGANISM ID, SPNG3: NORMAL
PLATELET # BLD AUTO: 281 K/UL (ref 150–400)
PLEASE NOTE, SPNG4: NORMAL
PMV BLD AUTO: 9.4 FL (ref 8.9–12.9)
POTASSIUM SERPL-SCNC: 3.5 MMOL/L (ref 3.5–5.1)
PROT SERPL-MCNC: 7.3 G/DL (ref 6.4–8.2)
RBC # BLD AUTO: 4.97 M/UL (ref 3.8–5.2)
RBC MORPH BLD: NORMAL
S PNEUM AG SPEC QL LA: NEGATIVE
SODIUM SERPL-SCNC: 138 MMOL/L (ref 136–145)
SPECIMEN SOURCE: NORMAL
SPECIMEN SOURCE: NORMAL
SPECIMEN, SPNG1: NORMAL
WBC # BLD AUTO: 6.3 K/UL (ref 3.6–11)

## 2020-02-14 PROCEDURE — 96372 THER/PROPH/DIAG INJ SC/IM: CPT

## 2020-02-14 PROCEDURE — 74011000258 HC RX REV CODE- 258: Performed by: FAMILY MEDICINE

## 2020-02-14 PROCEDURE — 74011250637 HC RX REV CODE- 250/637: Performed by: INTERNAL MEDICINE

## 2020-02-14 PROCEDURE — 77010033678 HC OXYGEN DAILY

## 2020-02-14 PROCEDURE — 94640 AIRWAY INHALATION TREATMENT: CPT

## 2020-02-14 PROCEDURE — 74011250636 HC RX REV CODE- 250/636: Performed by: INTERNAL MEDICINE

## 2020-02-14 PROCEDURE — 85025 COMPLETE CBC W/AUTO DIFF WBC: CPT

## 2020-02-14 PROCEDURE — 87899 AGENT NOS ASSAY W/OPTIC: CPT

## 2020-02-14 PROCEDURE — 65270000032 HC RM SEMIPRIVATE

## 2020-02-14 PROCEDURE — 94760 N-INVAS EAR/PLS OXIMETRY 1: CPT

## 2020-02-14 PROCEDURE — 80053 COMPREHEN METABOLIC PANEL: CPT

## 2020-02-14 PROCEDURE — 36415 COLL VENOUS BLD VENIPUNCTURE: CPT

## 2020-02-14 PROCEDURE — 96376 TX/PRO/DX INJ SAME DRUG ADON: CPT

## 2020-02-14 PROCEDURE — 74011250637 HC RX REV CODE- 250/637: Performed by: FAMILY MEDICINE

## 2020-02-14 PROCEDURE — 74011250636 HC RX REV CODE- 250/636: Performed by: FAMILY MEDICINE

## 2020-02-14 PROCEDURE — 99218 HC RM OBSERVATION: CPT

## 2020-02-14 PROCEDURE — 74011000250 HC RX REV CODE- 250: Performed by: FAMILY MEDICINE

## 2020-02-14 RX ORDER — ENOXAPARIN SODIUM 100 MG/ML
40 INJECTION SUBCUTANEOUS EVERY 24 HOURS
Status: DISCONTINUED | OUTPATIENT
Start: 2020-02-14 | End: 2020-02-17 | Stop reason: HOSPADM

## 2020-02-14 RX ORDER — GUAIFENESIN 600 MG/1
600 TABLET, EXTENDED RELEASE ORAL EVERY 12 HOURS
Status: DISCONTINUED | OUTPATIENT
Start: 2020-02-14 | End: 2020-02-17 | Stop reason: HOSPADM

## 2020-02-14 RX ADMIN — Medication 10 ML: at 14:50

## 2020-02-14 RX ADMIN — HEPARIN SODIUM 5000 UNITS: 5000 INJECTION INTRAVENOUS; SUBCUTANEOUS at 07:53

## 2020-02-14 RX ADMIN — IPRATROPIUM BROMIDE AND ALBUTEROL SULFATE 3 ML: .5; 3 SOLUTION RESPIRATORY (INHALATION) at 20:02

## 2020-02-14 RX ADMIN — BENZONATATE 100 MG: 100 CAPSULE ORAL at 19:24

## 2020-02-14 RX ADMIN — CEFTRIAXONE 1 G: 1 INJECTION, POWDER, FOR SOLUTION INTRAMUSCULAR; INTRAVENOUS at 14:50

## 2020-02-14 RX ADMIN — Medication 10 ML: at 05:49

## 2020-02-14 RX ADMIN — GUAIFENESIN 600 MG: 600 TABLET, EXTENDED RELEASE ORAL at 21:40

## 2020-02-14 RX ADMIN — GUAIFENESIN 600 MG: 600 TABLET, EXTENDED RELEASE ORAL at 09:51

## 2020-02-14 RX ADMIN — IPRATROPIUM BROMIDE AND ALBUTEROL SULFATE 3 ML: .5; 3 SOLUTION RESPIRATORY (INHALATION) at 23:17

## 2020-02-14 RX ADMIN — SODIUM CHLORIDE 75 ML/HR: 900 INJECTION, SOLUTION INTRAVENOUS at 09:52

## 2020-02-14 RX ADMIN — IPRATROPIUM BROMIDE AND ALBUTEROL SULFATE 3 ML: .5; 3 SOLUTION RESPIRATORY (INHALATION) at 08:13

## 2020-02-14 RX ADMIN — AZITHROMYCIN MONOHYDRATE 500 MG: 500 INJECTION, POWDER, LYOPHILIZED, FOR SOLUTION INTRAVENOUS at 15:26

## 2020-02-14 RX ADMIN — IPRATROPIUM BROMIDE AND ALBUTEROL SULFATE 3 ML: .5; 3 SOLUTION RESPIRATORY (INHALATION) at 03:41

## 2020-02-14 RX ADMIN — ENOXAPARIN SODIUM 40 MG: 40 INJECTION SUBCUTANEOUS at 17:05

## 2020-02-14 RX ADMIN — IPRATROPIUM BROMIDE AND ALBUTEROL SULFATE 3 ML: .5; 3 SOLUTION RESPIRATORY (INHALATION) at 16:23

## 2020-02-14 NOTE — PROGRESS NOTES
Problem: Patient Education: Go to Patient Education Activity  Goal: Patient/Family Education  Outcome: Progressing Towards Goal     Problem: Pneumonia: Day 1  Goal: Nutrition/Diet  Outcome: Progressing Towards Goal  Goal: Medications  Outcome: Progressing Towards Goal

## 2020-02-14 NOTE — PROGRESS NOTES
Bedside shift change report given to [address] [place] [address] (oncoming nurse) by CALDERON Cassidy (offgoing nurse). Report included the following information SBAR, Kardex, Procedure Summary, MAR and Recent Results.

## 2020-02-14 NOTE — PROGRESS NOTES
Problem: Patient Education: Go to Patient Education Activity  Goal: Patient/Family Education  Outcome: Progressing Towards Goal     Problem: Pneumonia: Day 1  Goal: Off Pathway (Use only if patient is Off Pathway)  Outcome: Progressing Towards Goal  Goal: Activity/Safety  Outcome: Progressing Towards Goal  Goal: Consults, if ordered  Outcome: Progressing Towards Goal  Goal: Diagnostic Test/Procedures  Outcome: Progressing Towards Goal  Goal: Nutrition/Diet  Outcome: Progressing Towards Goal  Goal: Medications  Outcome: Progressing Towards Goal  Goal: Respiratory  Outcome: Progressing Towards Goal  Goal: Treatments/Interventions/Procedures  Outcome: Progressing Towards Goal  Goal: Psychosocial  Outcome: Progressing Towards Goal  Goal: *Oxygen saturation within defined limits  Outcome: Progressing Towards Goal  Goal: *Influenza vaccine administered (October-March)  Outcome: Progressing Towards Goal  Goal: *Pneumoccocal vaccine administered  Outcome: Progressing Towards Goal  Goal: *Hemodynamically stable  Outcome: Progressing Towards Goal  Goal: *Demonstrates progressive activity  Outcome: Progressing Towards Goal  Goal: *Tolerating diet  Outcome: Progressing Towards Goal     Problem: Pneumonia: Day 2  Goal: Off Pathway (Use only if patient is Off Pathway)  Outcome: Progressing Towards Goal  Goal: Activity/Safety  Outcome: Progressing Towards Goal  Goal: Consults, if ordered  Outcome: Progressing Towards Goal  Goal: Diagnostic Test/Procedures  Outcome: Progressing Towards Goal  Goal: Nutrition/Diet  Outcome: Progressing Towards Goal  Goal: Discharge Planning  Outcome: Progressing Towards Goal  Goal: Medications  Outcome: Progressing Towards Goal  Goal: Respiratory  Outcome: Progressing Towards Goal  Goal: Treatments/Interventions/Procedures  Outcome: Progressing Towards Goal  Goal: Psychosocial  Outcome: Progressing Towards Goal  Goal: *Oxygen saturation within defined limits  Outcome: Progressing Towards Goal  Goal: *Hemodynamically stable  Outcome: Progressing Towards Goal  Goal: *Demonstrates progressive activity  Outcome: Progressing Towards Goal  Goal: *Tolerating diet  Outcome: Progressing Towards Goal  Goal: *Optimal pain control at patient's stated goal  Outcome: Progressing Towards Goal     Problem: Pneumonia: Day 3  Goal: Off Pathway (Use only if patient is Off Pathway)  Outcome: Progressing Towards Goal  Goal: Activity/Safety  Outcome: Progressing Towards Goal  Goal: Consults, if ordered  Outcome: Progressing Towards Goal  Goal: Diagnostic Test/Procedures  Outcome: Progressing Towards Goal  Goal: Nutrition/Diet  Outcome: Progressing Towards Goal  Goal: Discharge Planning  Outcome: Progressing Towards Goal  Goal: Medications  Outcome: Progressing Towards Goal  Goal: Respiratory  Outcome: Progressing Towards Goal  Goal: Treatments/Interventions/Procedures  Outcome: Progressing Towards Goal  Goal: Psychosocial  Outcome: Progressing Towards Goal  Goal: *Oxygen saturation within defined limits  Outcome: Progressing Towards Goal  Goal: *Hemodynamically stable  Outcome: Progressing Towards Goal  Goal: *Demonstrates progressive activity  Outcome: Progressing Towards Goal  Goal: *Tolerating diet  Outcome: Progressing Towards Goal  Goal: *Describes available resources and support systems  Outcome: Progressing Towards Goal  Goal: *Optimal pain control at patient's stated goal  Outcome: Progressing Towards Goal     Problem: Pneumonia: Day 4  Goal: Off Pathway (Use only if patient is Off Pathway)  Outcome: Progressing Towards Goal  Goal: Activity/Safety  Outcome: Progressing Towards Goal  Goal: Nutrition/Diet  Outcome: Progressing Towards Goal  Goal: Discharge Planning  Outcome: Progressing Towards Goal  Goal: Medications  Outcome: Progressing Towards Goal  Goal: Respiratory  Outcome: Progressing Towards Goal  Goal: Treatments/Interventions/Procedures  Outcome: Progressing Towards Goal  Goal: Psychosocial  Outcome: Progressing Towards Goal     Problem: Pneumonia: Discharge Outcomes  Goal: *Demonstrates progressive activity  Outcome: Progressing Towards Goal  Goal: *Describes follow-up/return visits to physicians  Outcome: Progressing Towards Goal  Goal: *Tolerating diet  Outcome: Progressing Towards Goal  Goal: *Verbalizes name, dosage, time, side effects, and number of days to continue medications  Outcome: Progressing Towards Goal  Goal: *Influenza immunization  Outcome: Progressing Towards Goal  Goal: *Pneumococcal immunization  Outcome: Progressing Towards Goal  Goal: *Respiratory status at baseline  Outcome: Progressing Towards Goal  Goal: *Vital signs within defined limits  Outcome: Progressing Towards Goal  Goal: *Describes available resources and support systems  Outcome: Progressing Towards Goal  Goal: *Optimal pain control at patient's stated goal  Outcome: Progressing Towards Goal     Problem: Falls - Risk of  Goal: *Absence of Falls  Description  Document Edgar Brunner Fall Risk and appropriate interventions in the flowsheet.   Outcome: Progressing Towards Goal  Note: Fall Risk Interventions:            Medication Interventions: Assess postural VS orthostatic hypotension, Evaluate medications/consider consulting pharmacy, Patient to call before getting OOB, Teach patient to arise slowly, Utilize gait belt for transfers/ambulation    Elimination Interventions: Call light in reach, Patient to call for help with toileting needs, Stay With Me (per policy), Toilet paper/wipes in reach, Toileting schedule/hourly rounds              Problem: Patient Education: Go to Patient Education Activity  Goal: Patient/Family Education  Outcome: Progressing Towards Goal

## 2020-02-14 NOTE — PROGRESS NOTES
TRANSFER - OUT REPORT:    Verbal report given to Stacy MARTINEZ(name) on Tana Reyez  being transferred to (unit) for routine progression of care       Report consisted of patients Situation, Background, Assessment and   Recommendations(SBAR). Information from the following report(s) SBAR, Kardex, Intake/Output, MAR, Accordion, Recent Results and Cardiac Rhythm SR/ST was reviewed with the receiving nurse. Lines:   Peripheral IV Left Antecubital (Active)   Site Assessment Clean, dry, & intact 2/14/2020 11:33 AM   Phlebitis Assessment 0 2/14/2020 11:33 AM   Infiltration Assessment 0 2/14/2020 11:33 AM   Dressing Status Clean, dry, & intact 2/14/2020 11:33 AM   Dressing Type Tape;Transparent 2/14/2020 11:33 AM   Hub Color/Line Status Pink 2/14/2020 11:33 AM   Action Taken Open ports on tubing capped 2/14/2020 11:33 AM   Alcohol Cap Used Yes 2/14/2020 11:33 AM       Peripheral IV 02/13/20 Right Hand (Active)   Site Assessment Clean, dry, & intact 2/14/2020 11:33 AM   Phlebitis Assessment 0 2/14/2020 11:33 AM   Infiltration Assessment 0 2/14/2020 11:33 AM   Dressing Status Clean, dry, & intact 2/14/2020 11:33 AM   Dressing Type Tape;Transparent 2/14/2020 11:33 AM   Hub Color/Line Status Pink 2/14/2020 11:33 AM   Action Taken Open ports on tubing capped 2/14/2020 11:33 AM   Alcohol Cap Used Yes 2/14/2020 11:33 AM        Opportunity for questions and clarification was provided.       Patient transported with:   O2 @ 4 liters              Problem: Patient Education: Go to Patient Education Activity  Goal: Patient/Family Education  Outcome: Progressing Towards Goal     Problem: Pneumonia: Day 1  Goal: Off Pathway (Use only if patient is Off Pathway)  Outcome: Progressing Towards Goal  Goal: Activity/Safety  Outcome: Progressing Towards Goal  Goal: Consults, if ordered  Outcome: Progressing Towards Goal  Goal: Diagnostic Test/Procedures  Outcome: Progressing Towards Goal  Goal: Nutrition/Diet  Outcome: Progressing Towards Goal  Goal: Medications  Outcome: Progressing Towards Goal  Goal: Respiratory  Outcome: Progressing Towards Goal  Goal: Treatments/Interventions/Procedures  Outcome: Progressing Towards Goal  Goal: Psychosocial  Outcome: Progressing Towards Goal  Goal: *Oxygen saturation within defined limits  Outcome: Progressing Towards Goal  Goal: *Influenza vaccine administered (October-March)  Outcome: Progressing Towards Goal  Goal: *Demonstrates progressive activity  Outcome: Progressing Towards Goal     Problem: Falls - Risk of  Goal: *Absence of Falls  Description  Document Theo Fall Risk and appropriate interventions in the flowsheet. Outcome: Progressing Towards Goal  Note: Fall Risk Interventions:            Medication Interventions: Evaluate medications/consider consulting pharmacy, Patient to call before getting OOB, Teach patient to arise slowly, Utilize gait belt for transfers/ambulation    Elimination Interventions: Call light in reach, Patient to call for help with toileting needs, Stay With Me (per policy), Toilet paper/wipes in reach, Toileting schedule/hourly rounds              Problem: Patient Education: Go to Patient Education Activity  Goal: Patient/Family Education  Outcome: Progressing Towards Goal     Problem: Pressure Injury - Risk of  Goal: *Prevention of pressure injury  Description  Document Curry Scale and appropriate interventions in the flowsheet. Outcome: Progressing Towards Goal  Note: Pressure Injury Interventions: Activity Interventions: Assess need for specialty bed, Increase time out of bed    Mobility Interventions: HOB 30 degrees or less, Assess need for specialty bed, Float heels, Turn and reposition approx.  every two hours(pillow and wedges)    Nutrition Interventions: Document food/fluid/supplement intake, Discuss nutritional consult with provider, Offer support with meals,snacks and hydration    Friction and Shear Interventions: HOB 30 degrees or less, Minimize layers                Problem: Patient Education: Go to Patient Education Activity  Goal: Patient/Family Education  Outcome: Progressing Towards Goal

## 2020-02-14 NOTE — PROGRESS NOTES
Hospitalist Progress Note      Hospital summary: The patient is a 39-year-old female with past medical history of hypertension, who presents to the hospital with the shortness of breath. he patient reports that she recently traveled to [facility], came back and started having some shortness of breath about a week back. The patient reports she was coughing \"a storm\" and reports that she has continued to feel ill. The family reports that she had chills, cough, and shortness of breath. Symptoms continued to get worse. She was seen in the urgent care today and was told that she has \"double pneumonia\" and she should go to the ER for further management and evaluation. The patient came to the ER and was found to be hypoxic, somewhat tachypneic, and was requested to be admitted under the hospitalist service. The patient had a fever of 101 degrees Fahrenheit and was requested to be admitted under the hospitalist service. 2/13/2020      Assessment/Plan:  Acute hypoxic respiratory failure - improving   Sepsis secondary to Pneumonia  - febrile, tachycardiac and tachypnea on poa  - CT of the chest: Bilateral pulmonary infiltrates. No PE  - normal lactic, wbc  - resp pcr and legionella negative  - sputum cx ordered  - c/w IV ceftriaxone and azithromycin  - saturating well on 4lNC, try to wean off  -  C/w mucinex, duoneb, IS    Morbid obesity Body mass index is 44.66 kg/m². recommend weight loss    Code status: Full  DVT prophylaxis: Lovenox  Disposition: TBD. Home when ready when oxygen weaned off   ----------------------------------------------    CC: sob    S: Patient is seen and examined at bedside.  present. Sob has improved. Explained that she need to be off oxygen dependent to be ready to be discharged. Review of Systems:  A comprehensive review of systems was negative.     O:  Visit Vitals  /74 (BP 1 Location: Right arm, BP Patient Position: At rest)   Pulse 73   Temp 99.8 °F (37.7 °C)   Resp 21   Ht 5' 3\" (1.6 m)   Wt 114.4 kg (252 lb 1.6 oz)   SpO2 93%   Breastfeeding No   BMI 44.66 kg/m²       PHYSICAL EXAM:  Gen: NAD  HEENT: anicteric sclerae, normal conjunctiva, oropharynx clear, MM moist  Neck: supple, trachea midline, no adenopathy  Heart: RRR, no MRG, no JVD, no peripheral edema  Lungs: decreased breath sounds at bases.  Rhonchi heard  Abd: soft, NT, ND, BS+, no organomegaly  Extr: warm  Skin: dry, no rash  Neuro: CN II-XII grossly intact, normal speech, moves all extremities  Psych: normal mood, appropriate affect      Intake/Output Summary (Last 24 hours) at 2/14/2020 1509  Last data filed at 2/14/2020 0810  Gross per 24 hour   Intake 1516.25 ml   Output 300 ml   Net 1216.25 ml        Recent labs & imaging reviewed:  Recent Results (from the past 24 hour(s))   POC EG7    Collection Time: 02/13/20  3:44 PM   Result Value Ref Range    Calcium, ionized (POC) 1.15 1.12 - 1.32 mmol/L    pH (POC) 7.454 (H) 7.35 - 7.45      pCO2 (POC) 33.8 (L) 35.0 - 45.0 MMHG    pO2 (POC) 58 (L) 80 - 100 MMHG    HCO3 (POC) 23.7 22 - 26 MMOL/L    Base excess (POC) 0 mmol/L    sO2 (POC) 91 (L) 92 - 97 %    Site LEFT RADIAL      Device: NASAL CANNULA      Flow rate (POC) 4 L/M    Allens test (POC) YES      Specimen type (POC) ARTERIAL     LEGIONELLA PNEUMOPHILA AG, URINE    Collection Time: 02/13/20  6:41 PM   Result Value Ref Range    Source URINE      L pneumophila S1 Ag, urine NEGATIVE  NEGATIVE     RESPIRATORY PANEL,PCR,NASOPHARYNGEAL    Collection Time: 02/13/20  6:56 PM   Result Value Ref Range    Adenovirus NOT DETECTED NOTD      Coronavirus 229E NOT DETECTED NOTD      Coronavirus HKU1 NOT DETECTED NOTD      Coronavirus CVNL63 NOT DETECTED NOTD      Coronavirus OC43 NOT DETECTED NOTD      Metapneumovirus NOT DETECTED NOTD      Rhinovirus and Enterovirus NOT DETECTED NOTD      Influenza A NOT DETECTED NOTD      Influenza A, subtype H1 NOT DETECTED NOTD      Influenza A, subtype H3 NOT DETECTED NOTD INFLUENZA A H1N1 PCR NOT DETECTED NOTD      Influenza B NOT DETECTED NOTD      Parainfluenza 1 NOT DETECTED NOTD      Parainfluenza 2 NOT DETECTED NOTD      Parainfluenza 3 NOT DETECTED NOTD      Parainfluenza virus 4 NOT DETECTED NOTD      RSV by PCR NOT DETECTED NOTD      B. parapertussis, PCR NOT DETECTED NOTD      Bordetella pertussis - PCR NOT DETECTED NOTD      Chlamydophila pneumoniae DNA, QL, PCR NOT DETECTED NOTD      Mycoplasma pneumoniae DNA, QL, PCR NOT DETECTED NOTD     TROPONIN I    Collection Time: 02/13/20  6:56 PM   Result Value Ref Range    Troponin-I, Qt. <0.05 <0.05 ng/mL   S. PNEUMO AG, UR/CSF    Collection Time: 02/13/20  6:56 PM   Result Value Ref Range    Source URINE      Specimen Urine     Streptococcus pneumoniae Ag NEGATIVE  NEGATIVE      Fluid culture Not indicated. Organism ID Not indicated. Please note Comment     METABOLIC PANEL, COMPREHENSIVE    Collection Time: 02/14/20  5:48 AM   Result Value Ref Range    Sodium 138 136 - 145 mmol/L    Potassium 3.5 3.5 - 5.1 mmol/L    Chloride 105 97 - 108 mmol/L    CO2 25 21 - 32 mmol/L    Anion gap 8 5 - 15 mmol/L    Glucose 111 (H) 65 - 100 mg/dL    BUN 10 6 - 20 MG/DL    Creatinine 0.71 0.55 - 1.02 MG/DL    BUN/Creatinine ratio 14 12 - 20      GFR est AA >60 >60 ml/min/1.73m2    GFR est non-AA >60 >60 ml/min/1.73m2    Calcium 8.8 8.5 - 10.1 MG/DL    Bilirubin, total 0.3 0.2 - 1.0 MG/DL    ALT (SGPT) 41 12 - 78 U/L    AST (SGOT) 46 (H) 15 - 37 U/L    Alk.  phosphatase 106 45 - 117 U/L    Protein, total 7.3 6.4 - 8.2 g/dL    Albumin 2.8 (L) 3.5 - 5.0 g/dL    Globulin 4.5 (H) 2.0 - 4.0 g/dL    A-G Ratio 0.6 (L) 1.1 - 2.2     CBC WITH AUTOMATED DIFF    Collection Time: 02/14/20  5:48 AM   Result Value Ref Range    WBC 6.3 3.6 - 11.0 K/uL    RBC 4.97 3.80 - 5.20 M/uL    HGB 13.0 11.5 - 16.0 g/dL    HCT 40.5 35.0 - 47.0 %    MCV 81.5 80.0 - 99.0 FL    MCH 26.2 26.0 - 34.0 PG    MCHC 32.1 30.0 - 36.5 g/dL    RDW 14.1 11.5 - 14.5 % PLATELET 213 962 - 189 K/uL    MPV 9.4 8.9 - 12.9 FL    NRBC 0.0 0  WBC    ABSOLUTE NRBC 0.00 0.00 - 0.01 K/uL    NEUTROPHILS 66 32 - 75 %    LYMPHOCYTES 21 12 - 49 %    MONOCYTES 12 5 - 13 %    EOSINOPHILS 1 0 - 7 %    BASOPHILS 0 0 - 1 %    IMMATURE GRANULOCYTES 0 %    ABS. NEUTROPHILS 4.1 1.8 - 8.0 K/UL    ABS. LYMPHOCYTES 1.3 0.8 - 3.5 K/UL    ABS. MONOCYTES 0.8 0.0 - 1.0 K/UL    ABS. EOSINOPHILS 0.1 0.0 - 0.4 K/UL    ABS. BASOPHILS 0.0 0.0 - 0.1 K/UL    ABS. IMM. GRANS. 0.0 K/UL    DF MANUAL      RBC COMMENTS ANISOCYTOSIS  1+         Recent Labs     02/14/20  0548 02/13/20  1122   WBC 6.3 6.7   HGB 13.0 14.3   HCT 40.5 43.2    275     Recent Labs     02/14/20  0548 02/13/20  1122    135*   K 3.5 3.8    104   CO2 25 26   BUN 10 9   CREA 0.71 0.80   * 124*   CA 8.8 8.9     Recent Labs     02/14/20  0548 02/13/20  1122   SGOT 46* 47*   ALT 41 40    119*   TBILI 0.3 0.4   TP 7.3 8.0   ALB 2.8* 3.1*   GLOB 4.5* 4.9*     No results for input(s): INR, PTP, APTT, INREXT in the last 72 hours. No results for input(s): FE, TIBC, PSAT, FERR in the last 72 hours. No results found for: FOL, RBCF   No results for input(s): PH, PCO2, PO2 in the last 72 hours.   Recent Labs     02/13/20  1856 02/13/20  1122   TROIQ <0.05 <0.05     No results found for: CHOL, CHOLX, CHLST, CHOLV, HDL, HDLP, LDL, LDLC, DLDLP, TGLX, TRIGL, TRIGP, CHHD, CHHDX  No results found for: Anabell Osorio  Lab Results   Component Value Date/Time    Color YELLOW/STRAW 02/13/2020 11:34 AM    Appearance CLOUDY (A) 02/13/2020 11:34 AM    Specific gravity 1.013 02/13/2020 11:34 AM    pH (UA) 6.0 02/13/2020 11:34 AM    Protein NEGATIVE  02/13/2020 11:34 AM    Glucose NEGATIVE  02/13/2020 11:34 AM    Ketone NEGATIVE  02/13/2020 11:34 AM    Bilirubin NEGATIVE  02/13/2020 11:34 AM    Urobilinogen 1.0 02/13/2020 11:34 AM    Nitrites NEGATIVE  02/13/2020 11:34 AM    Leukocyte Esterase NEGATIVE  02/13/2020 11:34 AM    Epithelial cells FEW 02/13/2020 11:34 AM    Bacteria NEGATIVE  02/13/2020 11:34 AM    WBC 0-4 02/13/2020 11:34 AM    RBC 0-5 02/13/2020 11:34 AM       Med list reviewed  Current Facility-Administered Medications   Medication Dose Route Frequency    guaiFENesin ER (MUCINEX) tablet 600 mg  600 mg Oral Q12H    sodium chloride (NS) flush 5-10 mL  5-10 mL IntraVENous PRN    sodium chloride (NS) flush 5-40 mL  5-40 mL IntraVENous Q8H    sodium chloride (NS) flush 5-40 mL  5-40 mL IntraVENous PRN    0.9% sodium chloride infusion  75 mL/hr IntraVENous CONTINUOUS    cefTRIAXone (ROCEPHIN) 1 g in 0.9% sodium chloride (MBP/ADV) 50 mL  1 g IntraVENous Q24H    acetaminophen (TYLENOL) tablet 650 mg  650 mg Oral Q4H PRN    heparin (porcine) injection 5,000 Units  5,000 Units SubCUTAneous Q8H    benzonatate (TESSALON) capsule 100 mg  100 mg Oral TID PRN    azithromycin (ZITHROMAX) 500 mg in 0.9% sodium chloride (MBP/ADV) 250 mL  500 mg IntraVENous Q24H    albuterol-ipratropium (DUO-NEB) 2.5 MG-0.5 MG/3 ML  3 mL Nebulization Q4H RT    influenza vaccine 2019-20 (6 mos+)(PF) (FLUARIX/FLULAVAL/FLUZONE QUAD) injection 0.5 mL  0.5 mL IntraMUSCular PRIOR TO DISCHARGE       Care Plan discussed with:  Patient/Family, Nurse and     Emily Cohen MD  Internal Medicine  Date of Service: 2/14/2020

## 2020-02-14 NOTE — PROGRESS NOTES
Problem: Pneumonia: Day 1  Goal: Medications  Outcome: Progressing Towards Goal    Pt on 6L NC with Duo-Nebs every 4 hours. Hourly rounding done, pt in NSR, on L NC, O2 saturation greater than 93%, ambulated to bathroom, urine yellow and clear, no complaints of pain throughout shift. 0800-Bedside shift change report given to Chen MARTINEZ (oncoming nurse) by Adan Kerns RN (offgoing nurse). Report included the following information SBAR, Kardex, ED Summary, Intake/Output, MAR, Accordion, Recent Results, Med Rec Status and Cardiac Rhythm NSR.

## 2020-02-15 LAB
FLUID CULTURE, SPNG2: NORMAL
ORGANISM ID, SPNG3: NORMAL
PLEASE NOTE, SPNG4: NORMAL
S PNEUM AG SPEC QL LA: NEGATIVE
SPECIMEN SOURCE: NORMAL
SPECIMEN, SPNG1: NORMAL

## 2020-02-15 PROCEDURE — 74011000250 HC RX REV CODE- 250: Performed by: INTERNAL MEDICINE

## 2020-02-15 PROCEDURE — 74011000258 HC RX REV CODE- 258: Performed by: FAMILY MEDICINE

## 2020-02-15 PROCEDURE — 74011250637 HC RX REV CODE- 250/637: Performed by: FAMILY MEDICINE

## 2020-02-15 PROCEDURE — 74011250636 HC RX REV CODE- 250/636: Performed by: INTERNAL MEDICINE

## 2020-02-15 PROCEDURE — 65270000032 HC RM SEMIPRIVATE

## 2020-02-15 PROCEDURE — 94760 N-INVAS EAR/PLS OXIMETRY 1: CPT

## 2020-02-15 PROCEDURE — 74011000250 HC RX REV CODE- 250: Performed by: FAMILY MEDICINE

## 2020-02-15 PROCEDURE — 94640 AIRWAY INHALATION TREATMENT: CPT

## 2020-02-15 PROCEDURE — 74011250636 HC RX REV CODE- 250/636: Performed by: FAMILY MEDICINE

## 2020-02-15 PROCEDURE — 96376 TX/PRO/DX INJ SAME DRUG ADON: CPT

## 2020-02-15 PROCEDURE — 74011250637 HC RX REV CODE- 250/637: Performed by: INTERNAL MEDICINE

## 2020-02-15 PROCEDURE — 96372 THER/PROPH/DIAG INJ SC/IM: CPT

## 2020-02-15 RX ORDER — IPRATROPIUM BROMIDE AND ALBUTEROL SULFATE 2.5; .5 MG/3ML; MG/3ML
3 SOLUTION RESPIRATORY (INHALATION)
Status: DISCONTINUED | OUTPATIENT
Start: 2020-02-15 | End: 2020-02-15

## 2020-02-15 RX ORDER — IPRATROPIUM BROMIDE AND ALBUTEROL SULFATE 2.5; .5 MG/3ML; MG/3ML
3 SOLUTION RESPIRATORY (INHALATION)
Status: DISCONTINUED | OUTPATIENT
Start: 2020-02-15 | End: 2020-02-17 | Stop reason: HOSPADM

## 2020-02-15 RX ADMIN — BENZONATATE 100 MG: 100 CAPSULE ORAL at 07:54

## 2020-02-15 RX ADMIN — CEFTRIAXONE 1 G: 1 INJECTION, POWDER, FOR SOLUTION INTRAMUSCULAR; INTRAVENOUS at 14:18

## 2020-02-15 RX ADMIN — ENOXAPARIN SODIUM 40 MG: 40 INJECTION SUBCUTANEOUS at 18:03

## 2020-02-15 RX ADMIN — IPRATROPIUM BROMIDE AND ALBUTEROL SULFATE 3 ML: .5; 3 SOLUTION RESPIRATORY (INHALATION) at 07:56

## 2020-02-15 RX ADMIN — GUAIFENESIN 600 MG: 600 TABLET, EXTENDED RELEASE ORAL at 07:53

## 2020-02-15 RX ADMIN — IPRATROPIUM BROMIDE AND ALBUTEROL SULFATE 3 ML: .5; 3 SOLUTION RESPIRATORY (INHALATION) at 19:11

## 2020-02-15 RX ADMIN — AZITHROMYCIN MONOHYDRATE 500 MG: 500 INJECTION, POWDER, LYOPHILIZED, FOR SOLUTION INTRAVENOUS at 15:10

## 2020-02-15 RX ADMIN — Medication 10 ML: at 20:23

## 2020-02-15 RX ADMIN — GUAIFENESIN 600 MG: 600 TABLET, EXTENDED RELEASE ORAL at 20:23

## 2020-02-15 RX ADMIN — Medication 10 ML: at 14:18

## 2020-02-15 RX ADMIN — IPRATROPIUM BROMIDE AND ALBUTEROL SULFATE 3 ML: .5; 3 SOLUTION RESPIRATORY (INHALATION) at 14:51

## 2020-02-15 RX ADMIN — IPRATROPIUM BROMIDE AND ALBUTEROL SULFATE 3 ML: .5; 3 SOLUTION RESPIRATORY (INHALATION) at 03:13

## 2020-02-15 NOTE — PROGRESS NOTES
ADULT PROTOCOL: JET AEROSOL ASSESSMENT    Patient  Yanni Grullon     62 y.o.   female     2/15/2020  11:52 AM    Breath Sounds Pre Procedure: Right Breath Sounds: Diminished                               Left Breath Sounds: Diminished    Breath Sounds Post Procedure: Right Breath Sounds: Diminished                                 Left Breath Sounds: Diminished    Breathing pattern: Pre procedure Breathing Pattern: Regular          Post procedure Breathing Pattern: Regular    Heart Rate: Pre procedure Pulse: 78           Post procedure Pulse: 78    Resp Rate: Pre procedure Respirations: 18           Post procedure Respirations: 18    Peak Flow: Pre bronchodilator             Post bronchodilator       FVC/FEV1:      Incentive Spirometry:             Cough: Pre procedure Cough: Strong               Post procedure Cough: Strong    Suctioned: NO    Sputum: Pre procedure                   Post procedure      Oxygen: O2 Device: Nasal cannula        Changed: NO    SpO2: Pre procedure SpO2: 91 %   with oxygen              Post procedure SpO2: 97 %  with oxygen    Nebulizer Therapy: Current medications Aerosolized Medications: DuoNeb      Changed: NO    Smoking History:     Problem List:   Patient Active Problem List   Diagnosis Code    Obesity, morbid (HCC) E66.01    Endometrial hyperplasia N85.00    [facility] C54.1    SOB (shortness of breath) R06.02    Pneumonia J18.9       Respiratory Therapist: Moreno Billy, RT

## 2020-02-15 NOTE — PROGRESS NOTES
Hospitalist Progress Note      Hospital summary: The patient is a 58-year-old female with past medical history of hypertension, who presents to the hospital with the shortness of breath. he patient reports that she recently traveled to [facility], came back and started having some shortness of breath about a week back. The patient reports she was coughing \"a storm\" and reports that she has continued to feel ill. The family reports that she had chills, cough, and shortness of breath. Symptoms continued to get worse. She was seen in the urgent care today and was told that she has \"double pneumonia\" and she should go to the ER for further management and evaluation. The patient came to the ER and was found to be hypoxic, somewhat tachypneic, and was requested to be admitted under the hospitalist service. The patient had a fever of 101 degrees Fahrenheit and was requested to be admitted under the hospitalist service. 2/13/2020      Assessment/Plan:  Acute hypoxic respiratory failure - improving   Sepsis secondary to Pneumonia  - febrile, tachycardiac and tachypnea on poa  - CT of the chest: Bilateral pulmonary infiltrates. No PE  - normal lactic, wbc  - resp pcr and legionella negative  - c/w IV ceftriaxone and azithromycin  - saturating well on 3lNC, try to wean off  -  C/w mucinex, duoneb, IS    Morbid obesity Body mass index is 43.93 kg/m². recommend weight loss    Code status: Full  DVT prophylaxis: Lovenox  Disposition: TBD. Home when oxygen weaned off   ----------------------------------------------    CC: sob    S: Patient is seen and examined at bedside. She feels much better. Sob has improved. Explained that she need to be off oxygen dependent to be ready to be discharged. Advised to sit and walk around the unit     Review of Systems:  A comprehensive review of systems was negative.     O:  Visit Vitals  /85 (BP 1 Location: Right arm, BP Patient Position: At rest)   Pulse 72 Temp 97.7 °F (36.5 °C)   Resp 18   Ht 5' 3\" (1.6 m)   Wt 112.5 kg (248 lb)   SpO2 94%   Breastfeeding No   BMI 43.93 kg/m²       PHYSICAL EXAM:  Gen: NAD  HEENT: anicteric sclerae, normal conjunctiva, oropharynx clear, MM moist  Neck: supple, trachea midline, no adenopathy  Heart: RRR, no MRG, no JVD, no peripheral edema  Lungs: decreased breath sounds at bases. Rhonchi heard  Abd: soft, NT, ND, BS+, no organomegaly  Extr: warm  Skin: dry, no rash  Neuro: CN II-XII grossly intact, normal speech, moves all extremities  Psych: normal mood, appropriate affect      Intake/Output Summary (Last 24 hours) at 2/15/2020 1341  Last data filed at 2/15/2020 1023  Gross per 24 hour   Intake 1434 ml   Output 900 ml   Net 534 ml        Recent labs & imaging reviewed:  Recent Results (from the past 24 hour(s))   S. PNEUMO AG, UR/CSF    Collection Time: 02/14/20  7:13 PM   Result Value Ref Range    Source URINE      Specimen Urine     Streptococcus pneumoniae Ag NEGATIVE  NEGATIVE      Fluid culture Not indicated. Organism ID Not indicated. Please note Comment       Recent Labs     02/14/20  0548 02/13/20  1122   WBC 6.3 6.7   HGB 13.0 14.3   HCT 40.5 43.2    275     Recent Labs     02/14/20  0548 02/13/20  1122    135*   K 3.5 3.8    104   CO2 25 26   BUN 10 9   CREA 0.71 0.80   * 124*   CA 8.8 8.9     Recent Labs     02/14/20  0548 02/13/20  1122   SGOT 46* 47*   ALT 41 40    119*   TBILI 0.3 0.4   TP 7.3 8.0   ALB 2.8* 3.1*   GLOB 4.5* 4.9*     No results for input(s): INR, PTP, APTT, INREXT, INREXT in the last 72 hours. No results for input(s): FE, TIBC, PSAT, FERR in the last 72 hours. No results found for: FOL, RBCF   No results for input(s): PH, PCO2, PO2 in the last 72 hours.   Recent Labs     02/13/20  1856 02/13/20  1122   TROIQ <0.05 <0.05     No results found for: CHOL, CHOLX, CHLST, CHOLV, HDL, HDLP, LDL, LDLC, DLDLP, TGLX, TRIGL, TRIGP, CHHD, CHHDX  No results found for: Munira Humphries  Lab Results   Component Value Date/Time    Color YELLOW/STRAW 02/13/2020 11:34 AM    Appearance CLOUDY (A) 02/13/2020 11:34 AM    Specific gravity 1.013 02/13/2020 11:34 AM    pH (UA) 6.0 02/13/2020 11:34 AM    Protein NEGATIVE  02/13/2020 11:34 AM    Glucose NEGATIVE  02/13/2020 11:34 AM    Ketone NEGATIVE  02/13/2020 11:34 AM    Bilirubin NEGATIVE  02/13/2020 11:34 AM    Urobilinogen 1.0 02/13/2020 11:34 AM    Nitrites NEGATIVE  02/13/2020 11:34 AM    Leukocyte Esterase NEGATIVE  02/13/2020 11:34 AM    Epithelial cells FEW 02/13/2020 11:34 AM    Bacteria NEGATIVE  02/13/2020 11:34 AM    WBC 0-4 02/13/2020 11:34 AM    RBC 0-5 02/13/2020 11:34 AM       Med list reviewed  Current Facility-Administered Medications   Medication Dose Route Frequency    albuterol-ipratropium (DUO-NEB) 2.5 MG-0.5 MG/3 ML  3 mL Nebulization Q6H RT    guaiFENesin ER (MUCINEX) tablet 600 mg  600 mg Oral Q12H    enoxaparin (LOVENOX) injection 40 mg  40 mg SubCUTAneous Q24H    sodium chloride (NS) flush 5-10 mL  5-10 mL IntraVENous PRN    sodium chloride (NS) flush 5-40 mL  5-40 mL IntraVENous Q8H    sodium chloride (NS) flush 5-40 mL  5-40 mL IntraVENous PRN    cefTRIAXone (ROCEPHIN) 1 g in 0.9% sodium chloride (MBP/ADV) 50 mL  1 g IntraVENous Q24H    acetaminophen (TYLENOL) tablet 650 mg  650 mg Oral Q4H PRN    benzonatate (TESSALON) capsule 100 mg  100 mg Oral TID PRN    azithromycin (ZITHROMAX) 500 mg in 0.9% sodium chloride (MBP/ADV) 250 mL  500 mg IntraVENous Q24H    influenza vaccine 2019-20 (6 mos+)(PF) (FLUARIX/FLULAVAL/FLUZONE QUAD) injection 0.5 mL  0.5 mL IntraMUSCular PRIOR TO DISCHARGE       Care Plan discussed with:  Patient/Family, Nurse and     Molina Toledo MD  Internal Medicine  Date of Service: 2/15/2020

## 2020-02-16 PROCEDURE — 74011000258 HC RX REV CODE- 258: Performed by: FAMILY MEDICINE

## 2020-02-16 PROCEDURE — 96372 THER/PROPH/DIAG INJ SC/IM: CPT

## 2020-02-16 PROCEDURE — 94760 N-INVAS EAR/PLS OXIMETRY 1: CPT

## 2020-02-16 PROCEDURE — 77010033678 HC OXYGEN DAILY

## 2020-02-16 PROCEDURE — 96376 TX/PRO/DX INJ SAME DRUG ADON: CPT

## 2020-02-16 PROCEDURE — 74011250637 HC RX REV CODE- 250/637: Performed by: FAMILY MEDICINE

## 2020-02-16 PROCEDURE — 74011250636 HC RX REV CODE- 250/636: Performed by: INTERNAL MEDICINE

## 2020-02-16 PROCEDURE — 74011250636 HC RX REV CODE- 250/636: Performed by: FAMILY MEDICINE

## 2020-02-16 PROCEDURE — 65270000032 HC RM SEMIPRIVATE

## 2020-02-16 PROCEDURE — 74011250637 HC RX REV CODE- 250/637: Performed by: INTERNAL MEDICINE

## 2020-02-16 PROCEDURE — 77030027138 HC INCENT SPIROMETER -A

## 2020-02-16 RX ADMIN — BENZONATATE 100 MG: 100 CAPSULE ORAL at 08:16

## 2020-02-16 RX ADMIN — Medication 10 ML: at 14:00

## 2020-02-16 RX ADMIN — GUAIFENESIN 600 MG: 600 TABLET, EXTENDED RELEASE ORAL at 21:22

## 2020-02-16 RX ADMIN — CEFTRIAXONE 1 G: 1 INJECTION, POWDER, FOR SOLUTION INTRAMUSCULAR; INTRAVENOUS at 13:55

## 2020-02-16 RX ADMIN — GUAIFENESIN 600 MG: 600 TABLET, EXTENDED RELEASE ORAL at 08:16

## 2020-02-16 RX ADMIN — AZITHROMYCIN MONOHYDRATE 500 MG: 500 INJECTION, POWDER, LYOPHILIZED, FOR SOLUTION INTRAVENOUS at 14:51

## 2020-02-16 RX ADMIN — ENOXAPARIN SODIUM 40 MG: 40 INJECTION SUBCUTANEOUS at 17:11

## 2020-02-16 RX ADMIN — Medication 10 ML: at 21:22

## 2020-02-16 NOTE — PROGRESS NOTES
Hospitalist Progress Note      Hospital summary: The patient is a 61-year-old female with past medical history of hypertension, who presents to the hospital with the shortness of breath. he patient reports that she recently traveled to [facility], came back and started having some shortness of breath about a week back. The patient reports she was coughing \"a storm\" and reports that she has continued to feel ill. The family reports that she had chills, cough, and shortness of breath. Symptoms continued to get worse. She was seen in the urgent care today and was told that she has \"double pneumonia\" and she should go to the ER for further management and evaluation. The patient came to the ER and was found to be hypoxic, somewhat tachypneic, and was requested to be admitted under the hospitalist service. The patient had a fever of 101 degrees Fahrenheit and was requested to be admitted under the hospitalist service. 2/13/2020      Assessment/Plan:  Acute hypoxic respiratory failure - improving slowly  Sepsis secondary to Pneumonia  - febrile, tachycardiac and tachypnea on poa  - CT of the chest: Bilateral pulmonary infiltrates. No PE  - normal lactic, wbc  - resp pcr and legionella negative  - c/w IV ceftriaxone and azithromycin  - saturating well on 2lNC, try to wean off  -  C/w mucinex, duoneb, IS    Morbid obesity Body mass index is 43.93 kg/m². recommend weight loss    Code status: Full  DVT prophylaxis: Lovenox  Disposition: TBD. Home when oxygen weaned off   ----------------------------------------------    CC: sob    S: Patient is seen and examined at bedside. She feels better and wants to go home. Explained that she need to be off oxygen to be discharged. Advised to sit and walk around the unit, use incentive spirometry     Review of Systems:  A comprehensive review of systems was negative.     O:  Visit Vitals  /62 (BP 1 Location: Left arm, BP Patient Position: At rest) Pulse 80   Temp 98.4 °F (36.9 °C)   Resp 19   Ht 5' 3\" (1.6 m)   Wt 112.5 kg (248 lb)   SpO2 94%   Breastfeeding No   BMI 43.93 kg/m²       PHYSICAL EXAM:  Gen: NAD  HEENT: anicteric sclerae, normal conjunctiva, oropharynx clear, MM moist  Neck: supple, trachea midline, no adenopathy  Heart: RRR, no MRG, no JVD, no peripheral edema  Lungs: decreased breath sounds at bases. Rhonchi heard  Abd: soft, NT, ND, BS+, no organomegaly  Extr: warm  Skin: dry, no rash  Neuro: CN II-XII grossly intact, normal speech, moves all extremities  Psych: normal mood, appropriate affect      Intake/Output Summary (Last 24 hours) at 2/16/2020 1217  Last data filed at 2/16/2020 1204  Gross per 24 hour   Intake 837 ml   Output    Net 837 ml        Recent labs & imaging reviewed:  No results found for this or any previous visit (from the past 24 hour(s)). Recent Labs     02/14/20  0548   WBC 6.3   HGB 13.0   HCT 40.5        Recent Labs     02/14/20  0548      K 3.5      CO2 25   BUN 10   CREA 0.71   *   CA 8.8     Recent Labs     02/14/20  0548   SGOT 46*   ALT 41      TBILI 0.3   TP 7.3   ALB 2.8*   GLOB 4.5*     No results for input(s): INR, PTP, APTT, INREXT, INREXT in the last 72 hours. No results for input(s): FE, TIBC, PSAT, FERR in the last 72 hours. No results found for: FOL, RBCF   No results for input(s): PH, PCO2, PO2 in the last 72 hours.   Recent Labs     02/13/20  1856   TROIQ <0.05     No results found for: CHOL, CHOLX, CHLST, CHOLV, HDL, HDLP, LDL, LDLC, DLDLP, TGLX, TRIGL, TRIGP, CHHD, CHHDX  No results found for: [facility]  Lab Results   Component Value Date/Time    Color YELLOW/STRAW 02/13/2020 11:34 AM    Appearance CLOUDY (A) 02/13/2020 11:34 AM    Specific gravity 1.013 02/13/2020 11:34 AM    pH (UA) 6.0 02/13/2020 11:34 AM    Protein NEGATIVE  02/13/2020 11:34 AM    Glucose NEGATIVE  02/13/2020 11:34 AM    Ketone NEGATIVE  02/13/2020 11:34 AM    Bilirubin NEGATIVE  02/13/2020 11:34 AM    Urobilinogen 1.0 02/13/2020 11:34 AM    Nitrites NEGATIVE  02/13/2020 11:34 AM    Leukocyte Esterase NEGATIVE  02/13/2020 11:34 AM    Epithelial cells FEW 02/13/2020 11:34 AM    Bacteria NEGATIVE  02/13/2020 11:34 AM    WBC 0-4 02/13/2020 11:34 AM    RBC 0-5 02/13/2020 11:34 AM       Med list reviewed  Current Facility-Administered Medications   Medication Dose Route Frequency    albuterol-ipratropium (DUO-NEB) 2.5 MG-0.5 MG/3 ML  3 mL Nebulization Q6H PRN    guaiFENesin ER (MUCINEX) tablet 600 mg  600 mg Oral Q12H    enoxaparin (LOVENOX) injection 40 mg  40 mg SubCUTAneous Q24H    sodium chloride (NS) flush 5-10 mL  5-10 mL IntraVENous PRN    sodium chloride (NS) flush 5-40 mL  5-40 mL IntraVENous Q8H    sodium chloride (NS) flush 5-40 mL  5-40 mL IntraVENous PRN    cefTRIAXone (ROCEPHIN) 1 g in 0.9% sodium chloride (MBP/ADV) 50 mL  1 g IntraVENous Q24H    acetaminophen (TYLENOL) tablet 650 mg  650 mg Oral Q4H PRN    benzonatate (TESSALON) capsule 100 mg  100 mg Oral TID PRN    azithromycin (ZITHROMAX) 500 mg in 0.9% sodium chloride (MBP/ADV) 250 mL  500 mg IntraVENous Q24H    influenza vaccine 2019-20 (6 mos+)(PF) (FLUARIX/FLULAVAL/FLUZONE QUAD) injection 0.5 mL  0.5 mL IntraMUSCular PRIOR TO DISCHARGE       Care Plan discussed with:  Patient/Family, Nurse and     Arin Rowe MD  Internal Medicine  Date of Service: 2/16/2020

## 2020-02-16 NOTE — PROGRESS NOTES
ADULT PROTOCOL: JET AEROSOL  REASSESSMENT    Patient  Skyla Dhaliwal     62 y.o.   female     2/15/2020  8:46 PM    Breath Sounds Pre Procedure: Right Breath Sounds: Diminished                               Left Breath Sounds: Diminished    Breath Sounds Post Procedure: Right Breath Sounds: Diminished                                 Left Breath Sounds: Diminished    Breathing pattern: Pre procedure Breathing Pattern: Regular          Post procedure Breathing Pattern: Regular    Heart Rate: Pre procedure Pulse: 87           Post procedure Pulse: 92    Resp Rate: Pre procedure Respirations: 18           Post procedure Respirations: 18          Cough: Pre procedure Cough: Non-productive               Post procedure Cough: Non-productive    Suctioned:     Sputum: Pre procedure                   Post procedure      Oxygen: O2 Device: Nasal cannula  3L     Changed:     SpO2: Pre procedure SpO2: 91 %               Post procedure SpO2: 92 %  {Wit    Nebulizer Therapy: Current medications Aerosolized Medications: DuoNeb every 6 hours      Changed: Duoneb every 6 hours as needed    Smoking History: no    Problem List:   Patient Active Problem List   Diagnosis Code    Obesity, morbid (HCC) E66.01    Endometrial hyperplasia N85.00    [facility] cancer ([facility] C54.1    SOB (shortness of breath) R06.02    Pneumonia J18.9       Respiratory Therapist: Sanjiv Moctezuma RT

## 2020-02-17 VITALS
SYSTOLIC BLOOD PRESSURE: 111 MMHG | HEIGHT: 63 IN | BODY MASS INDEX: 43.77 KG/M2 | DIASTOLIC BLOOD PRESSURE: 72 MMHG | HEART RATE: 79 BPM | WEIGHT: 247 LBS | OXYGEN SATURATION: 96 % | RESPIRATION RATE: 18 BRPM | TEMPERATURE: 98.5 F

## 2020-02-17 LAB
ANION GAP SERPL CALC-SCNC: 6 MMOL/L (ref 5–15)
BASOPHILS # BLD: 0 K/UL (ref 0–0.1)
BASOPHILS NFR BLD: 0 % (ref 0–1)
BUN SERPL-MCNC: 11 MG/DL (ref 6–20)
BUN/CREAT SERPL: 14 (ref 12–20)
CALCIUM SERPL-MCNC: 8.8 MG/DL (ref 8.5–10.1)
CHLORIDE SERPL-SCNC: 106 MMOL/L (ref 97–108)
CO2 SERPL-SCNC: 24 MMOL/L (ref 21–32)
CREAT SERPL-MCNC: 0.77 MG/DL (ref 0.55–1.02)
DIFFERENTIAL METHOD BLD: ABNORMAL
EOSINOPHIL # BLD: 0.1 K/UL (ref 0–0.4)
EOSINOPHIL NFR BLD: 2 % (ref 0–7)
ERYTHROCYTE [DISTWIDTH] IN BLOOD BY AUTOMATED COUNT: 13.8 % (ref 11.5–14.5)
GLUCOSE SERPL-MCNC: 118 MG/DL (ref 65–100)
HCT VFR BLD AUTO: 41.3 % (ref 35–47)
HGB BLD-MCNC: 13.5 G/DL (ref 11.5–16)
IMM GRANULOCYTES # BLD AUTO: 0.2 K/UL (ref 0–0.04)
IMM GRANULOCYTES NFR BLD AUTO: 2 % (ref 0–0.5)
LYMPHOCYTES # BLD: 2.5 K/UL (ref 0.8–3.5)
LYMPHOCYTES NFR BLD: 27 % (ref 12–49)
MCH RBC QN AUTO: 26.2 PG (ref 26–34)
MCHC RBC AUTO-ENTMCNC: 32.7 G/DL (ref 30–36.5)
MCV RBC AUTO: 80.2 FL (ref 80–99)
MONOCYTES # BLD: 0.8 K/UL (ref 0–1)
MONOCYTES NFR BLD: 9 % (ref 5–13)
NEUTS SEG # BLD: 5.6 K/UL (ref 1.8–8)
NEUTS SEG NFR BLD: 60 % (ref 32–75)
NRBC # BLD: 0 K/UL (ref 0–0.01)
NRBC BLD-RTO: 0 PER 100 WBC
PLATELET # BLD AUTO: 435 K/UL (ref 150–400)
PMV BLD AUTO: 9 FL (ref 8.9–12.9)
POTASSIUM SERPL-SCNC: 3.9 MMOL/L (ref 3.5–5.1)
RBC # BLD AUTO: 5.15 M/UL (ref 3.8–5.2)
SODIUM SERPL-SCNC: 136 MMOL/L (ref 136–145)
WBC # BLD AUTO: 9.3 K/UL (ref 3.6–11)

## 2020-02-17 PROCEDURE — 85025 COMPLETE CBC W/AUTO DIFF WBC: CPT

## 2020-02-17 PROCEDURE — 74011250637 HC RX REV CODE- 250/637: Performed by: INTERNAL MEDICINE

## 2020-02-17 PROCEDURE — 36415 COLL VENOUS BLD VENIPUNCTURE: CPT

## 2020-02-17 PROCEDURE — 74011250636 HC RX REV CODE- 250/636: Performed by: FAMILY MEDICINE

## 2020-02-17 PROCEDURE — 90686 IIV4 VACC NO PRSV 0.5 ML IM: CPT | Performed by: FAMILY MEDICINE

## 2020-02-17 PROCEDURE — 90471 IMMUNIZATION ADMIN: CPT

## 2020-02-17 PROCEDURE — 80048 BASIC METABOLIC PNL TOTAL CA: CPT

## 2020-02-17 RX ORDER — AMOXICILLIN AND CLAVULANATE POTASSIUM 875; 125 MG/1; MG/1
1 TABLET, FILM COATED ORAL EVERY 12 HOURS
Qty: 4 TAB | Refills: 0 | Status: SHIPPED | OUTPATIENT
Start: 2020-02-17 | End: 2020-02-19

## 2020-02-17 RX ORDER — IBUPROFEN 200 MG
1-2 CAPSULE ORAL
Qty: 10 CAP | Refills: 0 | Status: SHIPPED
Start: 2020-02-17

## 2020-02-17 RX ORDER — BENZONATATE 100 MG/1
100 CAPSULE ORAL
Qty: 10 CAP | Status: SHIPPED | OUTPATIENT
Start: 2020-02-17 | End: 2020-02-24

## 2020-02-17 RX ADMIN — INFLUENZA VIRUS VACCINE 0.5 ML: 15; 15; 15; 15 SUSPENSION INTRAMUSCULAR at 09:22

## 2020-02-17 RX ADMIN — GUAIFENESIN 600 MG: 600 TABLET, EXTENDED RELEASE ORAL at 08:41

## 2020-02-17 NOTE — PROGRESS NOTES
Transitons of care:   met with patient and her spouse. Patient does have the phone for med assist and currently does not have any insurance. She works independently as a . She was given information about [facility] in the [address] [place] [address] and I provided her with a phone and  if she wishes to pursue this option. She would like to have a PCP and an appt has been made for her. No other needs noted at this time.

## 2020-02-17 NOTE — PROGRESS NOTES
Hospital follow-up PCP transitional care appointment has been scheduled with Dr. Ana Sanders, NP for Thursday, 2/27/20 at 10:30 a.m. Pending patient discharge.   René Barrios, Care Management Specialist.

## 2020-02-17 NOTE — DISCHARGE INSTRUCTIONS
Patient Education        Pneumonia: Instruções de cuidados - [ Pneumonia: Care Instructions ]  Suas instruções de cuidados    Pneumonia é kit infecção dos pulmões. A [employer] surge a partir de infecções por bactéria ou vírus. A pneumonia pode ser moderada ou muito grave. Se causada por bactéria, será tratada com antibióticos. Levará algumas semanas ou poucos meses para kit recuperação total da pneumonia, dependendo da gravidade do haydee e de sua saúde brain um todo. O acompanhamento é parte crucial do seu tratamento e Star[employer]. Dulcie Dace a todas as consultas de acompanhamento, e ligue para o médico se você apresentar algum problema. Também é kit boa ideia saber os resultados de seus exames, e guardar kit [employer] que você jatinder. [place] cuidar de si mesmo em casa? · [place] os antibióticos exatamente brain prescritos. Não pare de radha o medicamento só porque está se sentindo melhor. Você precisa radha o cronograma completo dos antibióticos. · [place] os medicamentos exatamente brain prescritos. Fale com seu médico se achar que está tendo algum problema com o medicamento. · Descanse e durma bastante. Você pode se sentir fraco ou cansado por um tempo, mas seu nível de energia aumentará com o tempo. · Para evitar a desidratação, terry muito líquido, o suficiente para que [address] urina seja amarelo-jenny ou transparente, brain água. Prefira água e outras bebidas gaby cafeína até se sentir melhor. Se você possui insuficiência renal, cardíaca ou hepática, e precisa limitar a ingestão de fluidos, converse com seu médico antes de aumentar a quantidade de fluidos que ingere. · Cuide da tosse, para que você possa descansar. Kit tosse que expele [employer] pulmões é comum radu kit pneumonia. É um modo de o corpo se livrar da infecção.  Mas se a tosse impedir que você descanse ou causar muita fadiga e alonso no peito, converse com o médico. Naz pode sugerir que você tome um medicamento para reduzir a tosse.  · Use um vaporizador ou umidificador para elevar a umidade de seu quarto. Siga as instruções de [address] · Não fume ou permita que outros fumem ao seu redor. A fumaça fará sua tosse perdurar. Se precisar de ajuda para parar de fumar, fale com seu médico sobre programas e medicamentos antitabagismo. Isso pode aumentar sua chance de parar definitivamente de fumar. · [place] um medicamento de venda livre para aliviar a alonso, brain acetaminofeno (Tylenol), ibuprofeno (Advil, Motrin) ou naproxeno (Aleve). Sirisha e siga todas as instruções da bula. · Não tome dois ou mais medicamentos para a alonso ao mesmo tempo, a menos que solicitado pelo médico. Muitos medicamentos para alonso possuem acetaminofeno, o Tylenol. Paracetamol (Tylenol) em demasia pode ser prejudicial.  · Se você recebeu um espirômetro para medir a capacidade de funcionamento dos pulmões, use-o conforme indicado. Isso pode ajudar o médico a avaliar brain sua recuperação está prosseguindo. · Para prevenir a pneumonia no futuro, fale com o médico sobre a possibilidade de radha kit vacina contra a gripe (kit vez ao ano) e kit vacina pneumocócica (kit vez apenas para a [employer]). Quando você deve pedir ajuda? Ligue para a emergência( 911 nos EUA; 190 no [place]) sempre que achar que precisa de cuidados de emergência. Por exemplo, ligue se:  · Você tiver muita dificuldade em respirar. Ligue para o médico imediatamente ou procure atendimento médico de emergência se:  · Você tossir muco com sangue ou de cor escura (sputum). · Tiver nova dificuldade em respirar, ou se esta piorar. · Tiver tonturas ou vertigens ou se sentir que pode desmaiar. Esteja atento a quaisquer alterações à sua saúde e certifique-se de ligar para o médico se:  · Você apresentar febre, ou se esta piorar. · Você estiver tossindo mais forte e com maior frequência. · Você não melhorar após 2 arechiga (48 horas). · Você não apresentar melhora, conforme esperado.   Onde você pode aprender Herrera Belling? Acesse [email or web address]  Digite o D336 Na caixa de busca para saber mais sobre \"Pneumonia: Instruções de cuidados - [ Pneumonia: Care Instructions ]. \"  Na data de: 9 junho, 2019  Versão de conteúdo: 12.2  © 2690-7914 [employer]. Instruções de cuidados adaptadas sob licença de seu profissional da saúde. Se você tem dúvidas sobre um estado clínico ou essas instruções, sempre pergunte ao seu profissional da saúde. A [employer] renúncia a toda e qualquer garantia ou responsabilidade por seu uso dessas informações. Please bring this form with you to show your primary care provider at your follow-up appointment. Primary care provider:  Dr. John Arshad    Discharging provider:  Coco Souza MD    You have been admitted to the hospital with the following diagnoses:  · SOB (shortness of breath) [R06.02]  · Pneumonia [J18.9]    FOLLOW-UP CARE RECOMMENDATIONS:    APPOINTMENTS:  · Follow-up with primary care provider seen in  1 week    FOLLOW-UP TESTS recommended: none    PENDING TEST RESULTS:  At the time of your discharge the following test results are still pending: none  Please make sure you review these results with your outpatient follow-up provider(s). SYMPTOMS to watch for: chest pain, shortness of breath, fever, chills, nausea, vomiting, diarrhea, change in mentation, falling, weakness, bleeding. DIET/what to eat:  Regular Diet    ACTIVITY:  Activity as tolerated    What to do if new or unexpected symptoms occur? If you experience any of the above symptoms (or should other concerns or questions arise after discharge) please call your primary care physician. Return to the emergency room if you cannot get hold of your doctor. · It is very important that you keep your follow-up appointment(s).   · Please bring discharge papers, medication list (and/or medication bottles) to your follow-up appointments for review by your outpatient provider(s). · Please check the list of medications and be sure it includes every medication (even non-prescription medications) that your provider wants you to take. · It is important that you take the medication exactly as they are prescribed. · Keep your medication in the bottles provided by the pharmacist and keep a list of the medication names, dosages, and times to be taken in your wallet. · Do not take other medications without consulting your doctor. · If you have any questions about your medications or other instructions, please talk to your nurse or care provider before you leave the hospital.    I understand that if any problems occur once I am at home I am to contact my physician. These instructions were explained to me and I had the opportunity to ask questions.

## 2020-02-17 NOTE — DISCHARGE SUMMARY
Discharge Summary     Patient:  Joe Lopez       MRN: [id number]       YOB: 1960       Age: 62 y.o. Date of admission:  2/13/2020    Date of discharge:  2/17/2020    Primary care provider: Dr. Ashly Jorge    Admitting provider:  Lc Zuniga MD    Discharging provider:  Jules Morrissey 91.: [phone]. If unavailable, call [phone] and ask the  to page the triage hospitalist.    Consultations  · IP CONSULT TO HOSPITALIST    Procedures  · * No surgery found *    Discharge destination: home. The patient is stable for discharge. Admission diagnosis  · SOB (shortness of breath) [R06.02]  · Pneumonia [J18.9]    Current Discharge Medication List      START taking these medications    Details   benzonatate (TESSALON) 100 mg capsule Take 1 Cap by mouth three (3) times daily as needed for Cough for up to 7 days. Qty: 10 Cap, Refills: `0      amoxicillin-clavulanate (AUGMENTIN) 875-125 mg per tablet Take 1 Tab by mouth every twelve (12) hours for 2 days. Qty: 4 Tab, Refills: 0         CONTINUE these medications which have CHANGED    Details   ibuprofen 200 mg cap Take 1-2 Tabs by mouth every twelve (12) hours as needed for Pain. Qty: 10 Cap, Refills: 0         CONTINUE these medications which have NOT CHANGED    Details   diclofenac (VOLTAREN) 1 % gel Apply  to affected area four (4) times daily. Qty: 1 Each, Refills: 0    Associated Diagnoses: Chronic pain of right knee      acetaminophen (TYLENOL) 325 mg tablet Take 650 mg by mouth every four (4) hours as needed for Pain.              Follow-up Information     Follow up With Specialties Details Why Contact Info    None    None ([phone]) Patient stated that they have no PCP            Final discharge diagnoses and brief hospital course  The patient is a 55-year-old female with past medical history of hypertension, who presents to the hospital with the shortness of breath. he patient reports that she recently traveled to [facility], came back and started having some shortness of breath about a week back.  The patient reports she was coughing \"a storm\" and reports that she has continued to feel ill.  The family reports that she had chills, cough, and shortness of breath.  Symptoms continued to get worse. Libby Matthews was seen in the urgent care today and was told that she has \"double pneumonia\" and she should go to the ER for further management and evaluation.  The patient came to the ER and was found to be hypoxic, somewhat tachypneic, and was requested to be admitted under the hospitalist service.  The patient had a fever of 101 degrees Fahrenheit and was requested to be admitted under the hospitalist service    Acute hypoxic respiratory failure - resolved  Sepsis secondary to Pneumonia- improved   - febrile, tachycardiac and tachypnea on poa  - CT of the chest: Bilateral pulmonary infiltrates. No PE  - normal lactic, wbc  - resp pcr and legionella negative  - on IV ceftriaxone and azithromycin. Discharged on po Augmentin x total of 7 days of abx   - saturating well on RA  -  on mucinex, duoneb prn, IS     Morbid obesity Body mass index is 43.93 kg/m². recommend weight loss    Recommendations:  New PCP f/u Dr. Willy Almaraz, KARINA for Thursday, 2/27/20 at 10:30 a.m. Complete abx    Discharge plan discussed with patient and her  at bedside. They understood and agreed      Physical examination at discharge  Visit Vitals  /72 (BP 1 Location: Right arm, BP Patient Position: At rest)   Pulse 79   Temp 98.5 °F (36.9 °C)   Resp 18   Ht 5' 3\" (1.6 m)   Wt 112 kg (247 lb)   SpO2 96%   Breastfeeding No   BMI 43.75 kg/m²     Gen: NAD  HEENT: anicteric sclerae, normal conjunctiva, oropharynx clear, MM moist  Neck: supple, trachea midline, no adenopathy  Heart: RRR, no MRG, no JVD, no peripheral edema  Lungs: decreased breath sounds at bases.  Rhonchi heard  Abd: soft, NT, ND, BS+, no organomegaly  Extr: warm  Skin: dry, no rash  Neuro: CN II-XII grossly intact, normal speech, moves all extremities  Psych: normal mood, appropriate affect    Pertinent imaging studies:    CT of the chest: Bilateral pulmonary infiltrates. No PE    Recent Labs     02/17/20  0307   WBC 9.3   HGB 13.5   HCT 41.3   *     Recent Labs     02/17/20  0307      K 3.9      CO2 24   BUN 11   CREA 0.77   *   CA 8.8     No results for input(s): SGOT, GPT, AP, TBIL, TP, ALB, GLOB, GGT, AML, LPSE in the last 72 hours. No lab exists for component: AMYP, HLPSE  No results for input(s): INR, PTP, APTT, INREXT in the last 72 hours. No results for input(s): FE, TIBC, PSAT, FERR in the last 72 hours. No results for input(s): PH, PCO2, PO2 in the last 72 hours. No results for input(s): CPK, CKMB in the last 72 hours. No lab exists for component: TROPONINI  No components found for: [place]    Chronic Diagnoses:    Problem List as of 2/17/2020 Date Reviewed: 7/29/2019          Codes Class Noted - Resolved    Pneumonia ICD-10-CM: J18.9  ICD-9-CM: 965  2/14/2020 - Present        SOB (shortness of breath) ICD-10-CM: R06.02  ICD-9-CM: 786.05  2/13/2020 - Present        Endometrial cancer ([facility] ICD-10-CM: C54.1  ICD-9-CM: 182.0  10/11/2018 - Present        Endometrial hyperplasia ICD-10-CM: N85.00  ICD-9-CM: 621.30  8/22/2018 - Present        Obesity, morbid ([facility] ICD-10-CM: E66.01  ICD-9-CM: 278.01  8/10/2018 - Present              Time spent on discharge related activities today greater than 30 minutes.       Signed:  Terra Mayers MD                 Hospitalist, Internal Medicine      Cc: None

## 2020-02-18 LAB
BACTERIA SPEC CULT: NORMAL
SERVICE CMNT-IMP: NORMAL

## 2020-02-25 ENCOUNTER — TELEPHONE (OUTPATIENT)
Dept: INTERNAL MEDICINE CLINIC | Age: 60
End: 2020-02-25

## 2020-02-25 NOTE — TELEPHONE ENCOUNTER
Pt has upcoming appointment on 2/27/20 with [facility] that was scheduled for pt at discharge from [facility] on 2/17/20. Noted that pt had told inpt CM that she does not currently have insurance and provided her information for [facility]. Called pt-  Linda Solitario answered- verified with 2 identifiers. Linda Solitario is a patient at [facility] with Dr. Theda Bence, but he is receiving medicare. They are hoping that his wife will get insurance through [facility] but not sure when that will be effective. In the meantime, they are not looking to take on any expense of self pay appointment. Provided him the info again for [facility] and provided him my direct contact information if he has any difficulties getting Yamilet an appointment there. Once pt is insured he will plan to ask Dr. Theda Bence if he is able to take her on his panel.     Cx'd pt appt in [address]

## 2021-03-14 ENCOUNTER — HOSPITAL ENCOUNTER (EMERGENCY)
Age: 61
Discharge: HOME | End: 2021-03-14
Attending: EMERGENCY MEDICINE

## 2021-03-14 ENCOUNTER — APPOINTMENT (OUTPATIENT)
Dept: ULTRASOUND IMAGING | Age: 61
End: 2021-03-14
Attending: EMERGENCY MEDICINE

## 2021-03-14 VITALS
HEART RATE: 68 BPM | DIASTOLIC BLOOD PRESSURE: 71 MMHG | TEMPERATURE: 97.7 F | RESPIRATION RATE: 18 BRPM | BODY MASS INDEX: 43.75 KG/M2 | HEIGHT: 63 IN | SYSTOLIC BLOOD PRESSURE: 144 MMHG | OXYGEN SATURATION: 96 %

## 2021-03-14 DIAGNOSIS — M79.604 RIGHT LEG PAIN: Primary | ICD-10-CM

## 2021-03-14 PROCEDURE — 99283 EMERGENCY DEPT VISIT LOW MDM: CPT

## 2021-03-14 PROCEDURE — 93971 EXTREMITY STUDY: CPT

## 2021-03-14 NOTE — ED TRIAGE NOTES
Patient presents to the emergency department reporting right leg pain. Patient reports popliteal pain/  Patient reports symptoms for several months, but has had difficulty walking and sustained a fall Friday due to the pain. Patient reports numbness above the knee now.

## 2021-03-14 NOTE — ED NOTES
Pt discharged in stable condition at this time. Dr. Adebayo Dowling reviewed discharge instructions and follow up with patient at bedside. Pt verbalized understanding and denies any needs or questions.

## 2021-03-14 NOTE — ED PROVIDER NOTES
Date of Service:  3/14/2021    Patient:  Alisson Murray    Chief Complaint:  Leg Pain       HPI:  Alisson Murray is a 56 y.o.  female who presents for evaluation of leg pain. Patient complains of right leg pain for a month has been worsening. Last evening the pain got so bad that her leg \"gave out on her\" and she had a fall. No head strike. Patient denies any type of head neck or back pain. No loss of bowel or bladder. No numbness. Patient states that she has had \"a knot\" in the back of her knee for this time. Now she complains of some generalized also feels tingling on the anterior surface. She usually ambulates but today has had to have some assistance because is been so difficult for her to ambulate secondary to discomfort. Right thigh pain medially but no decreased sensation.   She otherwise denies other acute complaints or modifying factors           Past Medical History:   Diagnosis Date    Elevated BP        Past Surgical History:   Procedure Laterality Date    HX  SECTION      X4    HX HEENT      HX TONSILLECTOMY           Family History:   Problem Relation Age of Onset    Hypertension Mother     Cancer Brother         lymphoma    Cancer Maternal Uncle         unknown    Hypertension Maternal Grandmother     Hypertension Maternal Grandfather     Cancer Maternal Grandfather         stomach cancer    Cancer Other         breast cancer (cousin)    Anesth Problems Neg Hx        Social History     Socioeconomic History    Marital status:      Spouse name: Not on file    Number of children: Not on file    Years of education: Not on file    Highest education level: Not on file   Occupational History    Not on file   Social Needs    Financial resource strain: Not on file    Food insecurity     Worry: Not on file     Inability: Not on file    Transportation needs     Medical: Not on file     Non-medical: Not on file   Tobacco Use    Smoking status: Never Smoker    Smokeless tobacco: Never Used   Substance and Sexual Activity    Alcohol use: No    Drug use: No    Sexual activity: Yes   Lifestyle    Physical activity     Days per week: Not on file     Minutes per session: Not on file    Stress: Not on file   Relationships    Social connections     Talks on phone: Not on file     Gets together: Not on file     Attends [employer] service: Not on file     Active member of club or organization: Not on file     Attends meetings of clubs or organizations: Not on file     Relationship status: Not on file    Intimate partner violence     Fear of current or ex partner: Not on file     Emotionally abused: Not on file     Physically abused: Not on file     Forced sexual activity: Not on file   Other Topics Concern    Not on file   Social History Narrative    Not on file         ALLERGIES: Patient has no known allergies. Review of Systems   Constitutional: Negative for fever. HENT: Negative for hearing loss. Eyes: Negative for visual disturbance. Respiratory: Negative for shortness of breath. Cardiovascular: Negative for chest pain. Gastrointestinal: Negative for abdominal pain. Genitourinary: Negative for flank pain. Musculoskeletal: Positive for gait problem. Negative for back pain, neck pain and neck stiffness. Skin: Negative for rash. Neurological: Negative for dizziness, weakness, light-headedness and numbness. Psychiatric/Behavioral: Negative for confusion. Vitals:    03/14/21 1325   BP: 137/64   Pulse: 72   Resp: 16   Temp: 97.7 °F (36.5 °C)   SpO2: 98%   Height: 5' 3\" (1.6 m)            Physical Exam  Vitals signs and nursing note reviewed. Constitutional:       Appearance: Normal appearance. HENT:      Head: Normocephalic and atraumatic. Neck:      Musculoskeletal: Normal range of motion. Cardiovascular:      Rate and Rhythm: Normal rate. Pulses: Normal pulses. Pulmonary:      Effort: Pulmonary effort is normal. No respiratory distress. Abdominal:      General: Abdomen is flat. Musculoskeletal: Normal range of motion. General: Tenderness (medial right thigh, posterior knee) present. No swelling. Skin:     General: Skin is warm. Capillary Refill: Capillary refill takes less than 2 seconds. Findings: No erythema. Neurological:      Mental Status: She is alert and oriented to person, place, and time. Sensory: No sensory deficit. Motor: No weakness. Psychiatric:         Mood and Affect: Mood normal.          MDM  Number of Diagnoses or Management Options        VITAL SIGNS:  Patient Vitals for the past 4 hrs:   Pulse Resp BP SpO2   03/14/21 1720 68 18 (!) 144/71 96 %         LABS:  No results found for this or any previous visit (from the past 6 hour(s)). IMAGING:  No orders to display         Medications During Visit:  Medications - No data to display      DECISION MAKING:  Joan Lozano is a 56 y.o. female who comes in as above. Here, patient's DVT scan is unremarkable for anything acute. Patient has reproducible discomfort in the right lower thigh. The weakness that she describes is more of her leg giving out because of pain but there is no acute cause for the discomfort. The rest of her physical exam is unremarkable she has no neurological deficits. Spoke with the family about possible muscle strain as some specific positions against resistance reproduces her discomfort. Patient is provided with a walker to help her ambulate at home and asked to follow with her PCP. Family is agreeable to this plan. All questions answered      IMPRESSION:  1.  Right leg pain        DISPOSITION:  Discharged      Discharge Medication List as of 3/14/2021  5:13 PM      CONTINUE these medications which have NOT CHANGED    Details   ibuprofen 200 mg cap Take 1-2 Tabs by mouth every twelve (12) hours as needed for Pain., No Print, Disp-10 Cap, R-0      diclofenac (VOLTAREN) 1 % gel Apply  to affected area four (4) times daily., Normal, Disp-1 Each, R-0      acetaminophen (TYLENOL) 325 mg tablet Take 650 mg by mouth every four (4) hours as needed for Pain., Historical Med              Follow-up Information     Follow up With Specialties Details Why Contact Info    Provided PCP  Schedule an appointment as soon as possible for a visit               The patient is asked to follow-up with their primary care provider in the next several days. They are to call tomorrow for an appointment. The patient is asked to return promptly for any increased concerns or worsening of symptoms. They can return to this emergency department or any other emergency department.       Procedures

## 2022-02-22 ENCOUNTER — TELEPHONE (OUTPATIENT)
Dept: OBGYN CLINIC | Age: 62
End: 2022-02-22

## 2022-02-22 ENCOUNTER — OFFICE VISIT (OUTPATIENT)
Dept: OBGYN CLINIC | Age: 62
End: 2022-02-22

## 2022-02-22 VITALS
SYSTOLIC BLOOD PRESSURE: 156 MMHG | HEIGHT: 63 IN | BODY MASS INDEX: 46.6 KG/M2 | DIASTOLIC BLOOD PRESSURE: 94 MMHG | WEIGHT: 263 LBS

## 2022-02-22 DIAGNOSIS — B37.9 CANDIDA ALBICANS INFECTION: Primary | ICD-10-CM

## 2022-02-22 PROBLEM — L40.9 PSORIASIS: Status: ACTIVE | Noted: 2022-02-22

## 2022-02-22 PROCEDURE — 99213 OFFICE O/P EST LOW 20 MIN: CPT | Performed by: OBSTETRICS & GYNECOLOGY

## 2022-02-22 RX ORDER — FLUCONAZOLE 150 MG/1
150 TABLET ORAL
Qty: 3 TABLET | Refills: 0 | Status: SHIPPED | OUTPATIENT
Start: 2022-02-22 | End: 2022-03-03

## 2022-02-22 RX ORDER — NYSTATIN 100000 U/G
OINTMENT TOPICAL 2 TIMES DAILY
Qty: 15 G | Refills: 2 | Status: SHIPPED | OUTPATIENT
Start: 2022-02-22

## 2022-02-22 RX ORDER — HYDROCORTISONE 25 MG/G
CREAM TOPICAL
COMMUNITY
Start: 2022-01-18

## 2022-02-22 NOTE — PROGRESS NOTES
Problem Visit    Flaquita Bowers is a 59 y.o.  presenting for problem visit. Her main concern today is a rash and itching under her breasts, in her groin, and vaginally. Evaluated by dermatology and diagnosed with psoriasis and given hydrocortisone cream. This helps when she is using it but when she stops all the symptoms return. Increased stressors at home. Her  passed away and grand daughter diagnosed with diabetes. History of endometrial cancer. She is accompanied by her daughter Yuliya Salguero. Pt's daughter translates. Pt is having itching/irritation under right axilla, right breast and groin.      Past Medical History:   Diagnosis Date    Elevated BP     Endometrial cancer (HCC)        Past Surgical History:   Procedure Laterality Date    HX  SECTION      X4    HX HEENT      HX TONSILLECTOMY         Family History   Problem Relation Age of Onset    Hypertension Mother     Cancer Brother         lymphoma    Cancer Maternal Uncle         unknown    Hypertension Maternal Grandmother     Hypertension Maternal Grandfather     Cancer Maternal Grandfather         stomach cancer    Cancer Other         breast cancer (cousin)    Anesth Problems Neg Hx        Social History     Socioeconomic History    Marital status:      Spouse name: Not on file    Number of children: Not on file    Years of education: Not on file    Highest education level: Not on file   Occupational History    Not on file   Tobacco Use    Smoking status: Never Smoker    Smokeless tobacco: Never Used   Substance and Sexual Activity    Alcohol use: No    Drug use: No    Sexual activity: Not Currently   Other Topics Concern    Not on file   Social History Narrative    Not on file     Social Determinants of Health     Financial Resource Strain:     Difficulty of Paying Living Expenses: Not on file   Food Insecurity:     Worried About [address] in the Last Year: Not on file    Kay [employer] Food in the Last Year: Not on file   Transportation Needs:     Lack of Transportation (Medical): Not on file    Lack of Transportation (Non-Medical): Not on file   Physical Activity:     Days of Exercise per Week: Not on file    Minutes of Exercise per Session: Not on file   Stress:     Feeling of Stress : Not on file   Social Connections:     Frequency of Communication with Friends and Family: Not on file    Frequency of Social Gatherings with Friends and Family: Not on file    Attends Catholic Services: Not on file    Active Member of [address] [place] [address] [employer] or Organizations: Not on file    Attends Club or Organization Meetings: Not on file    Marital Status: Not on file   Intimate Partner Violence:     Fear of Current or Ex-Partner: Not on file    Emotionally Abused: Not on file    Physically Abused: Not on file    Sexually Abused: Not on file   Housing Stability:     Unable to Pay for Housing in the Last Year: Not on file    Number of [place] in the Last Year: Not on file    Unstable Housing in the Last Year: Not on file       Current Outpatient Medications   Medication Sig Dispense Refill    hydrocortisone (HYTONE) 2.5 % topical cream APPLY TO RASH IN UNDERARMS AND GROIN TWICE DAILY FOR UP TO 2 WEEKS/MONTH AS NEEDED      ibuprofen 200 mg cap Take 1-2 Tabs by mouth every twelve (12) hours as needed for Pain. (Patient not taking: Reported on 2/22/2022) 10 Cap 0    diclofenac (VOLTAREN) 1 % gel Apply  to affected area four (4) times daily. (Patient not taking: Reported on 2/22/2022) 1 Each 0    acetaminophen (TYLENOL) 325 mg tablet Take 650 mg by mouth every four (4) hours as needed for Pain.  (Patient not taking: Reported on 2/22/2022)         No Known Allergies    Review of Systems - History obtained from the patient  Constitutional: negative for weight loss, fever, night sweats  HEENT: negative for hearing loss, earache, congestion, snoring, sorethroat  CV: negative for chest pain, palpitations, edema  Resp: negative for cough, shortness of breath, wheezing  GI: negative for change in bowel habits, abdominal pain, black or bloody stools  : negative for frequency, dysuria, hematuria, vaginal discharge  MSK: negative for back pain, joint pain, muscle pain  Breast: negative for breast lumps, nipple discharge, galactorrhea  Skin : positive for itching, rash  Neuro: negative for dizziness, headache, confusion, weakness  Psych: negative for anxiety, depression, change in mood  Heme/lymph: negative for bleeding, bruising, pallor    Physical Exam    Visit Vitals  BP (!) 156/94   Ht 5' 3\" (1.6 m)   Wt 263 lb (119.3 kg)   BMI 46.59 kg/m²       Constitutional  · Appearance: well-nourished, well developed, alert, in no acute distress    HENT  · Head and Face: appears normal    Neck  · Inspection/Palpation: normal appearance, no masses or tenderness  · Lymph Nodes: no lymphadenopathy present      Breasts  · Inspection of Breasts: breasts symmetrical, erythema under right breast c/w yeast, no discharge present, nipple appearance normal, no skin retraction present    Genitourinary  · External Genitalia: normal appearance for age, yeast present, mild tenderness present, no inflammatory lesions present, no masses present, no atrophy present    · Perineum: perineum within normal limits, no evidence of trauma, no rashes or skin lesions present    Skin  · General Inspection: no rash, no lesions identified    Neurologic/Psychiatric  · Mental Status:  · Orientation: grossly oriented to person, place and time  · Mood and Affect: mood normal, affect appropriate      Assessment/Plan:  59 y.o. F with yeast infection under axilla, breast and groin. Will treat with diflucan every 3 days x 3 doses and nystatin.        RTC: 1 year for AE or sooner prn          Bridget Herring MD

## 2022-02-22 NOTE — PATIENT INSTRUCTIONS
Vaginal Yeast Infection: Care Instructions  Overview     A vaginal yeast infection is the growth of too many yeast cells in the vagina. This is common in women of all ages. Itching, vaginal discharge and irritation, and other symptoms can bother you. But yeast infections don't often cause other health problems. Some medicines can increase your risk of getting a yeast infection. These include antibiotics, birth control pills, hormones, and steroids. You may also be more likely to get a yeast infection if you are pregnant, have diabetes, douche, or wear tight clothes. With treatment, most yeast infections get better in 2 to 3 days. Follow-up care is a key part of your treatment and safety. Be sure to make and go to all appointments, and call your doctor if you are having problems. It's also a good idea to know your test results and keep a list of the medicines you take. How can you care for yourself at home? · Take your medicines exactly as prescribed. Call your doctor if you think you are having a problem with your medicine. · Ask your doctor about over-the-counter (OTC) medicines for yeast infections. They may cost less than prescription medicines. If you use an OTC treatment, read and follow all instructions on the label. · Don't use tampons while using a vaginal cream or suppository. The tampons can absorb the medicine. Use pads instead. · Wear loose cotton clothing. Don't wear nylon or other fabric that holds body heat and moisture close to the skin. · Try sleeping without underwear. · Don't scratch. Relieve itching with a cold pack or a cool bath. · Don't wash your vaginal area more than once a day. Use plain water or a mild, unscented soap. Air-dry the vaginal area. · Change out of wet swimsuits after swimming. · If you are using a vaginal medicine, don't have sex until you have finished your treatment. But if you do have sex, don't depend on a condom or diaphragm for birth control.  The oil in some vaginal medicines weakens latex. · Don't douche. When should you call for help? Call your doctor now or seek immediate medical care if:    · You have unexpected vaginal bleeding.     · You have new or increased pain in your vagina or pelvis. Watch closely for changes in your health, and be sure to contact your doctor if:    · You have a fever.     · You are not getting better after 2 days.     · Your symptoms come back after you finish your medicines. Where can you learn more? Go to http://navi-dorian.info/  Enter I542 in the search box to learn more about \"Vaginal Yeast Infection: Care Instructions. \"  Current as of: February 11, 2021               Content Version: 13.0  © 2006-2021 [employer]. Care instructions adapted under license by [employer] (which disclaims liability or warranty for this information). If you have questions about a medical condition or this instruction, always ask your healthcare professional. [address] any warranty or liability for your use of this information.

## 2022-02-22 NOTE — TELEPHONE ENCOUNTER
Pt. Calling to report that she may have a yeast infection. She reports itchiness and odor. She has been using hydrocortisone cream 2% since this has been an ongoing issue, and that has been helping but when she finishes it, her symptoms worsen. Pt. Was put on the providers schedule at 1100 today. She was advised that she would have to pay $100 upfront since she does not have insurance. Pt. Verbalized understanding and no further questions were present at this time. Home with Assistance

## 2022-03-18 PROBLEM — L40.9 PSORIASIS: Status: ACTIVE | Noted: 2022-02-22

## 2022-03-19 PROBLEM — E66.01 OBESITY, MORBID (HCC): Status: ACTIVE | Noted: 2018-08-10

## 2022-03-19 PROBLEM — C54.1 ENDOMETRIAL CANCER (HCC): Status: ACTIVE | Noted: 2018-10-11

## 2022-03-19 PROBLEM — N85.00 ENDOMETRIAL HYPERPLASIA: Status: ACTIVE | Noted: 2018-08-22

## 2022-03-20 PROBLEM — R06.02 SOB (SHORTNESS OF BREATH): Status: ACTIVE | Noted: 2020-02-13

## 2022-03-20 PROBLEM — J18.9 PNEUMONIA: Status: ACTIVE | Noted: 2020-02-14

## 2023-05-12 RX ORDER — ACETAMINOPHEN 325 MG/1
TABLET ORAL EVERY 4 HOURS PRN
COMMUNITY

## 2023-05-12 RX ORDER — NYSTATIN 100000 U/G
OINTMENT TOPICAL 2 TIMES DAILY
COMMUNITY
Start: 2022-02-22

## 2023-05-12 RX ORDER — OMEGA-3 FATTY ACIDS/FISH OIL 300-1000MG
1-2 CAPSULE ORAL
COMMUNITY
Start: 2020-02-17

## (undated) DEVICE — BLADELESS OPTICAL TROCAR WITH FIXATION CANNULA: Brand: VERSAONE

## (undated) DEVICE — DRAPE,REIN 53X77,STERILE: Brand: MEDLINE

## (undated) DEVICE — APPLICATOR BNDG 1MM ADH PREMIERPRO EXOFIN

## (undated) DEVICE — STERILE NEOPRENE POWDER-FREE SURGICAL GLOVES WITH NITRILE COATING: Brand: PROTEXIS

## (undated) DEVICE — TRAY PREP DRY W/ PREM GLV 2 APPL 6 SPNG 2 UNDPD 1 OVERWRAP

## (undated) DEVICE — UNIVERSAL FIXATION CANNULA: Brand: VERSAONE

## (undated) DEVICE — KENDALL SCD EXPRESS SLEEVES, KNEE LENGTH, MEDIUM: Brand: KENDALL SCD

## (undated) DEVICE — Z INACTIVE USE 2527070 DRAPE SURG W40XL44IN UNDERBUTTOCK SMS POLYPR W/ PCH BK DISP

## (undated) DEVICE — CATH FOL TY IC BAG 16FR 2000ML -- CONVERT TO ITEM 363158

## (undated) DEVICE — TK® TI-KNOT® DEVICE: Brand: TK® TI-KNOT®

## (undated) DEVICE — BLADELESS OPTICAL TROCAR WITH FIXATION CANNULA: Brand: VERSAPORT

## (undated) DEVICE — TK® QUICK LOAD® UNIT: Brand: TK® QUICK LOAD®

## (undated) DEVICE — SHEAR HARMONIC ACET 5MMX36CM -- ACE PLUS

## (undated) DEVICE — GLOVE SURG SZ 75 L1212IN FNGR THK138MIL BRN LTX FREE

## (undated) DEVICE — INFECTION CONTROL KIT SYS

## (undated) DEVICE — SOLUTION IV 1000ML 0.9% SOD CHL

## (undated) DEVICE — LIGHT HANDLE: Brand: DEVON

## (undated) DEVICE — REM POLYHESIVE ADULT PATIENT RETURN ELECTRODE: Brand: VALLEYLAB

## (undated) DEVICE — PAD SANIT NPKN 4IN GRD

## (undated) DEVICE — LEGGINGS, PAIR, 31X48, STERILE: Brand: MEDLINE

## (undated) DEVICE — STERILE POLYISOPRENE POWDER-FREE SURGICAL GLOVES WITH EMOLLIENT COATING: Brand: PROTEXIS

## (undated) DEVICE — 3000CC GUARDIAN II: Brand: GUARDIAN

## (undated) DEVICE — Device

## (undated) DEVICE — (D)PREP SKN CHLRAPRP APPL 26ML -- CONVERT TO ITEM 371833

## (undated) DEVICE — SYR 50ML LR LCK 1ML GRAD NSAF --

## (undated) DEVICE — SURGICAL PROCEDURE PACK GYN LAPAROSCOPY CUST SMH LF

## (undated) DEVICE — SUTURE MCRYL SZ 4-0 L27IN ABSRB UD L19MM PS-2 1/2 CIR PRIM Y426H

## (undated) DEVICE — DEVON™ KNEE AND BODY STRAP 60" X 3" (1.5 M X 7.6 CM): Brand: DEVON